# Patient Record
Sex: FEMALE | Race: BLACK OR AFRICAN AMERICAN | Employment: FULL TIME | URBAN - METROPOLITAN AREA
[De-identification: names, ages, dates, MRNs, and addresses within clinical notes are randomized per-mention and may not be internally consistent; named-entity substitution may affect disease eponyms.]

---

## 2019-11-23 ENCOUNTER — HOSPITAL ENCOUNTER (EMERGENCY)
Age: 54
Discharge: HOME | End: 2019-11-23
Attending: EMERGENCY MEDICINE
Payer: COMMERCIAL

## 2019-11-23 VITALS
RESPIRATION RATE: 14 BRPM | WEIGHT: 185 LBS | BODY MASS INDEX: 32.77 KG/M2 | OXYGEN SATURATION: 99 % | TEMPERATURE: 97.7 F | SYSTOLIC BLOOD PRESSURE: 137 MMHG | HEART RATE: 89 BPM | DIASTOLIC BLOOD PRESSURE: 86 MMHG

## 2019-11-23 DIAGNOSIS — K08.89 PAIN, DENTAL: Primary | ICD-10-CM

## 2019-11-23 PROCEDURE — 99282 EMERGENCY DEPT VISIT SF MDM: CPT

## 2019-11-23 PROCEDURE — 96372 THER/PROPH/DIAG INJ SC/IM: CPT

## 2019-11-23 PROCEDURE — 6360000002 HC RX W HCPCS: Performed by: STUDENT IN AN ORGANIZED HEALTH CARE EDUCATION/TRAINING PROGRAM

## 2019-11-23 PROCEDURE — 6370000000 HC RX 637 (ALT 250 FOR IP): Performed by: STUDENT IN AN ORGANIZED HEALTH CARE EDUCATION/TRAINING PROGRAM

## 2019-11-23 RX ORDER — KETOROLAC TROMETHAMINE 30 MG/ML
30 INJECTION, SOLUTION INTRAMUSCULAR; INTRAVENOUS ONCE
Status: COMPLETED | OUTPATIENT
Start: 2019-11-23 | End: 2019-11-23

## 2019-11-23 RX ORDER — IBUPROFEN 800 MG/1
800 TABLET ORAL EVERY 8 HOURS PRN
Qty: 30 TABLET | Refills: 0 | Status: SHIPPED | OUTPATIENT
Start: 2019-11-23

## 2019-11-23 RX ORDER — ACETAMINOPHEN 500 MG
1000 TABLET ORAL EVERY 6 HOURS PRN
Qty: 30 TABLET | Refills: 0 | Status: SHIPPED | OUTPATIENT
Start: 2019-11-23

## 2019-11-23 RX ORDER — ACETAMINOPHEN 500 MG
1000 TABLET ORAL ONCE
Status: COMPLETED | OUTPATIENT
Start: 2019-11-23 | End: 2019-11-23

## 2019-11-23 RX ADMIN — ACETAMINOPHEN 1000 MG: 500 TABLET ORAL at 15:08

## 2019-11-23 RX ADMIN — KETOROLAC TROMETHAMINE 30 MG: 30 INJECTION, SOLUTION INTRAMUSCULAR at 15:03

## 2019-11-23 ASSESSMENT — PAIN DESCRIPTION - ORIENTATION: ORIENTATION: RIGHT;LOWER

## 2019-11-23 ASSESSMENT — ENCOUNTER SYMPTOMS
WHEEZING: 0
VOMITING: 0
RHINORRHEA: 0
NAUSEA: 0
SHORTNESS OF BREATH: 0
PHOTOPHOBIA: 0
COLOR CHANGE: 0
ABDOMINAL PAIN: 0

## 2019-11-23 ASSESSMENT — PAIN DESCRIPTION - ONSET: ONSET: ON-GOING

## 2019-11-23 ASSESSMENT — PAIN DESCRIPTION - PROGRESSION: CLINICAL_PROGRESSION: GRADUALLY WORSENING

## 2019-11-23 ASSESSMENT — PAIN DESCRIPTION - LOCATION: LOCATION: JAW

## 2019-11-23 ASSESSMENT — PAIN SCALES - GENERAL
PAINLEVEL_OUTOF10: 10

## 2019-11-23 ASSESSMENT — PAIN DESCRIPTION - PAIN TYPE: TYPE: ACUTE PAIN

## 2019-11-23 ASSESSMENT — PAIN DESCRIPTION - FREQUENCY: FREQUENCY: CONTINUOUS

## 2021-06-06 ENCOUNTER — HOSPITAL ENCOUNTER (EMERGENCY)
Age: 56
Discharge: HOME | End: 2021-06-06
Attending: EMERGENCY MEDICINE
Payer: COMMERCIAL

## 2021-06-06 VITALS
BODY MASS INDEX: 34.02 KG/M2 | HEIGHT: 63 IN | OXYGEN SATURATION: 100 % | HEART RATE: 94 BPM | RESPIRATION RATE: 16 BRPM | WEIGHT: 192 LBS | TEMPERATURE: 97.3 F

## 2021-06-06 DIAGNOSIS — K04.7 DENTAL INFECTION: Primary | ICD-10-CM

## 2021-06-06 PROCEDURE — 6370000000 HC RX 637 (ALT 250 FOR IP): Performed by: STUDENT IN AN ORGANIZED HEALTH CARE EDUCATION/TRAINING PROGRAM

## 2021-06-06 PROCEDURE — 99283 EMERGENCY DEPT VISIT LOW MDM: CPT

## 2021-06-06 RX ORDER — CLINDAMYCIN HYDROCHLORIDE 150 MG/1
150 CAPSULE ORAL ONCE
Status: COMPLETED | OUTPATIENT
Start: 2021-06-06 | End: 2021-06-06

## 2021-06-06 RX ORDER — CLINDAMYCIN HYDROCHLORIDE 300 MG/1
300 CAPSULE ORAL 3 TIMES DAILY
Qty: 21 CAPSULE | Refills: 0 | Status: SHIPPED | OUTPATIENT
Start: 2021-06-06 | End: 2021-06-13

## 2021-06-06 RX ORDER — OXYCODONE HYDROCHLORIDE AND ACETAMINOPHEN 5; 325 MG/1; MG/1
1 TABLET ORAL ONCE
Status: COMPLETED | OUTPATIENT
Start: 2021-06-06 | End: 2021-06-06

## 2021-06-06 RX ORDER — IBUPROFEN 600 MG/1
600 TABLET ORAL 3 TIMES DAILY PRN
Qty: 15 TABLET | Refills: 0 | Status: SHIPPED | OUTPATIENT
Start: 2021-06-06 | End: 2021-06-11

## 2021-06-06 RX ADMIN — CLINDAMYCIN HYDROCHLORIDE 150 MG: 150 CAPSULE ORAL at 19:42

## 2021-06-06 RX ADMIN — OXYCODONE HYDROCHLORIDE AND ACETAMINOPHEN 1 TABLET: 5; 325 TABLET ORAL at 19:17

## 2021-06-06 RX ADMIN — CLINDAMYCIN HYDROCHLORIDE 150 MG: 150 CAPSULE ORAL at 19:17

## 2021-06-06 ASSESSMENT — PAIN DESCRIPTION - DESCRIPTORS: DESCRIPTORS: CONSTANT

## 2021-06-06 ASSESSMENT — ENCOUNTER SYMPTOMS
VOMITING: 0
ABDOMINAL PAIN: 0
COUGH: 0
SHORTNESS OF BREATH: 0
SORE THROAT: 0
FACIAL SWELLING: 0
TROUBLE SWALLOWING: 0
DIARRHEA: 0

## 2021-06-06 ASSESSMENT — PAIN DESCRIPTION - FREQUENCY: FREQUENCY: CONTINUOUS

## 2021-06-06 ASSESSMENT — PAIN DESCRIPTION - ORIENTATION: ORIENTATION: LEFT;UPPER

## 2021-06-06 ASSESSMENT — PAIN DESCRIPTION - PAIN TYPE: TYPE: CHRONIC PAIN

## 2021-06-06 ASSESSMENT — PAIN DESCRIPTION - LOCATION: LOCATION: MOUTH

## 2021-06-06 ASSESSMENT — PAIN SCALES - GENERAL: PAINLEVEL_OUTOF10: 9

## 2021-06-06 NOTE — ED NOTES
Bed: 05  Expected date:   Expected time:   Means of arrival:   Comments:  JORGE Vazquez, RN  06/06/21 4725

## 2021-06-06 NOTE — ED PROVIDER NOTES
[facility]  ED  Emergency Department Encounter  EmergencyMedicine Resident     Pt Name:Larisa Angel  MRN: [id number]  [place] 1965  Date of evaluation: 6/6/21  PCP:  Karey Sandifer, MD    CHIEF COMPLAINT       Chief Complaint   Patient presents with    Dental Pain       HISTORY OF PRESENT ILLNESS  (Location/Symptom, Timing/Onset, Context/Setting, Quality, Duration, Modifying Factors, Severity.)      Qamar Almaguer is a 63 y.o. female with no significant past medical history presenting with 1 month history of gum swelling and pain in the area of left upper canine. Reports that pain is getting worse, not associated with any discharge or fever. No trismus, facial swelling or inability to swallow saliva. Able to eat and drink fine without any trouble. Denies chest pain, shortness of breath, vomiting, abdominal pain, dysuria or leg swelling. Gums appear red and are tender in the left upper canine area, but no fluctuance otherwise. Patient is allergic to penicillins. PAST MEDICAL / SURGICAL / SOCIAL / FAMILY HISTORY      has a past medical history of Allergic rhinitis and Seizures ([facility] [address] has no past surgical history on file.     Social History     Socioeconomic History    Marital status: Single     Spouse name: Not on file    Number of children: Not on file    Years of education: Not on file    Highest education level: Not on file   Occupational History    Not on file   Tobacco Use    Smoking status: Current Every Day Smoker     Packs/day: 1.00     Years: 30.00     Pack years: 30.00     Types: Cigarettes    Smokeless tobacco: Never Used   Substance and Sexual Activity    Alcohol use: Yes     Comment: weekends-  beer and liquor    Drug use: No    Sexual activity: Not on file   Other Topics Concern    Not on file   Social History Narrative    Not on file     Social Determinants of Health     Financial Resource Strain:     Difficulty of Paying Living Expenses: Food Insecurity:     Worried About Running Out of Food in the Last Year:     [address] in the Last Year:    Transportation Needs:     Lack of Transportation (Medical):  Lack of Transportation (Non-Medical):    Physical Activity:     Days of Exercise per Week:     Minutes of Exercise per Session:    Stress:     Feeling of Stress :    Social Connections:     Frequency of Communication with Friends and Family:     Frequency of Social Gatherings with Friends and Family:     Attends [employer] Services:     Active Member of Clubs or Organizations:     Attends Club or Organization Meetings:     Marital Status:    Intimate Partner Violence:     Fear of Current or Ex-Partner:     Emotionally Abused:     Physically Abused:     Sexually Abused:        Family History   Problem Relation Age of Onset    Diabetes Mother        Allergies:  Pcn [penicillins]    Home Medications:  Prior to Admission medications    Medication Sig Start Date End Date Taking? Authorizing Provider   clindamycin (CLEOCIN) 300 MG capsule Take 1 capsule by mouth 3 times daily for 7 days 6/6/21 6/13/21 Yes Malou Jean MD   ibuprofen (ADVIL;MOTRIN) 600 MG tablet Take 1 tablet by mouth 3 times daily as needed for Pain 6/6/21 6/11/21 Yes Felipe Olguin MD   acetaminophen (TYLENOL) 500 MG tablet Take 2 tablets by mouth every 6 hours as needed for Pain Do no exceed this dose recommendation 11/23/19   Regine Salas DO   ibuprofen (ADVIL;MOTRIN) 800 MG tablet Take 1 tablet by mouth every 8 hours as needed for Pain Do not exceed dose recommendation 11/23/19   Regine Salas DO   nicotine polacrilex (NICORETTE) 2 MG gum Take 1 each by mouth as needed for Smoking cessation Maximum dose: 24 pieces/24 hours.  10/10/16   Suraj Clayton MD   naproxen (NAPROSYN) 250 MG tablet Take 2 tablets by mouth 2 times daily (with meals) 9/12/16   Shari Ruano DO   loratadine (CLARITIN) 10 MG tablet Take 1 tablet by mouth daily. 6/16/14   Vania Moscoso MD       REVIEW OF SYSTEMS    (2-9 systems for level 4, 10 or more for level 5)      Review of Systems   Constitutional: Negative for chills and fever. HENT: Positive for dental problem (dental pain). Negative for drooling, facial swelling, sore throat and trouble swallowing. Respiratory: Negative for cough and shortness of breath. Cardiovascular: Negative for chest pain and palpitations. Gastrointestinal: Negative for abdominal pain, diarrhea and vomiting. Genitourinary: Negative for dysuria and frequency. Musculoskeletal: Negative for joint swelling. Skin: Negative for wound. Neurological: Negative for dizziness and headaches. Hematological: Does not bruise/bleed easily. Psychiatric/Behavioral: Negative for agitation and confusion. PHYSICAL EXAM   (up to 7 for level 4, 8 or more for level 5)      INITIAL VITALS:   Pulse 94   Temp 97.3 °F (36.3 °C) (Temporal)   Resp 16   Ht 5' 3\" (1.6 m)   Wt 192 lb (87.1 kg)   SpO2 100%   BMI 34.01 kg/m²     Alert and oriented x3  Left upper canine are gum tenderness, redness but no fluctuance. No facial swelling. No oral discharge  Chest clear b/l  Abdomen soft, non tender and non-distended  No focal neurological deficit  No pedal edema    DIFFERENTIAL  DIAGNOSIS     PLAN (LABS / IMAGING / EKG):  No orders of the defined types were placed in this encounter. MEDICATIONS ORDERED:  Orders Placed This Encounter   Medications    oxyCODONE-acetaminophen (PERCOCET) 5-325 MG per tablet 1 tablet    clindamycin (CLEOCIN) capsule 150 mg     Order Specific Question:   Antimicrobial Indications     Answer: Other     Order Specific Question:   Other Abx Indication     Answer:   dental infection    clindamycin (CLEOCIN) capsule 150 mg     Order Specific Question:   Antimicrobial Indications     Answer:    Other     Order Specific Question:   Other Abx Indication     Answer:   dental infection    clindamycin (CLEOCIN) 300 MG capsule     Sig: Take 1 capsule by mouth 3 times daily for 7 days     Dispense:  21 capsule     Refill:  0    ibuprofen (ADVIL;MOTRIN) 600 MG tablet     Sig: Take 1 tablet by mouth 3 times daily as needed for Pain     Dispense:  15 tablet     Refill:  0           DIAGNOSTIC RESULTS / EMERGENCY DEPARTMENT COURSE / MDM     LABS:  No results found for this visit on 06/06/21. RADIOLOGY:  No results found. EKG  None    All EKG's are interpreted by the Emergency Department Physician who either signs or Co-signs this chart in the absence of a cardiologist.    EMERGENCY DEPARTMENT COURSE/IMPRESSION:      65-year-old female presenting with left upper canine area dental and gum pain. Patient has acute tenderness in the gum area, but no clear fluctuance. No trismus or facial swelling. No concern of Pb's angina or dental abscess. Given a dose of clindamycin and Percocet. Plan to discharge home on clindamycin and ibuprofen. Follow-up with the dentist.  Return back to ED in case of worsening symptoms or facial swelling. Patient verbalized understanding. PROCEDURES:  None    CONSULTS:  None    CRITICAL CARE:  None    FINAL IMPRESSION      1. Dental infection          DISPOSITION / PLAN     DISPOSITION    Discharge home    PATIENT REFERRED TO:  No follow-up provider specified.     DISCHARGE MEDICATIONS:  New Prescriptions    CLINDAMYCIN (CLEOCIN) 300 MG CAPSULE    Take 1 capsule by mouth 3 times daily for 7 days    IBUPROFEN (ADVIL;MOTRIN) 600 MG TABLET    Take 1 tablet by mouth 3 times daily as needed for Pain       Felipe Alvarez MD  Emergency Medicine Resident    (Please note that portions of this note were completed with a voice recognition program.  Efforts were made to edit the dictations but occasionally words aremis-transcribed.)       Ashley Sumner MD  Resident  06/06/21 2005

## 2021-06-07 NOTE — ED PROVIDER NOTES
[facility]     Emergency Department     Faculty Attestation    I performed a history and physical examination of the patient and discussed management with the resident. I reviewed the residents note and agree with the documented findings and plan of care. Any areas of disagreement are noted on the chart. I was personally present for the key portions of any procedures. I have documented in the chart those procedures where I was not present during the key portions. I have reviewed the emergency nurses triage note. I agree with the chief complaint, past medical history, past surgical history, allergies, medications, social and family history as documented unless otherwise noted below. For Physician Assistant/ Nurse Practitioner cases/documentation I have personally evaluated this patient and have completed at least one if not all key elements of the E/M (history, physical exam, and MDM). Additional findings are as noted. I have personally seen and evaluated the patient. I find the patient's history and physical exam are consistent with the NP/PA documentation. I agree with the care provided, treatment rendered, disposition and follow-up plan. 79-year-old female presenting with dental pain over the left upper premolars. No trauma, has been having swelling for approximately 1 month. Last saw her dentist several months ago. No bleeding or drainage from the mouth. Has been using Orajel at home with no significant improvement. No fevers or chills. Did notice some facial swelling this morning. Exam:  General: Sitting on the bed, awake, alert and in no acute distress  CV: normal rate and regular rhythm  Lungs: Breathing comfortably on room air with no tachypnea, hypoxia, or increased work of breathing  HEENT: Mild swelling of the zygoma on the left compared to the right. Gum erythema and slight edema over tooth 12 and 13. No significant fluctuance.   No fractured tooth visible. Plan:  Gum erythema and edema, possible developing abscess. Will start on clindamycin (allergic to penicillin), give pain control, and have her follow-up with her dentist.  Discussed return precautions verbally and in discharge paperwork; including worsening swelling, difficulty swallowing or breathing, difficulty opening the mouth, or any new symptoms.         Corwin Evans MD   Attending Emergency  Physician    (Please note that portions of this note were completed with a voice recognition program. Efforts were made to edit the dictations but occasionally words are mis-transcribed.)              Corwin Evans MD  06/07/21 4421

## 2021-08-29 ENCOUNTER — HOSPITAL ENCOUNTER (EMERGENCY)
Age: 56
Discharge: HOME | End: 2021-08-29
Attending: EMERGENCY MEDICINE
Payer: COMMERCIAL

## 2021-08-29 VITALS
RESPIRATION RATE: 16 BRPM | DIASTOLIC BLOOD PRESSURE: 88 MMHG | BODY MASS INDEX: 34.02 KG/M2 | TEMPERATURE: 97 F | HEART RATE: 107 BPM | OXYGEN SATURATION: 100 % | HEIGHT: 63 IN | WEIGHT: 192 LBS | SYSTOLIC BLOOD PRESSURE: 126 MMHG

## 2021-08-29 DIAGNOSIS — L30.4 INTERTRIGINOUS DERMATITIS ASSOCIATED WITH MOISTURE: Primary | ICD-10-CM

## 2021-08-29 LAB
CHP ED QC CHECK: YES
GLUCOSE BLD-MCNC: 93 MG/DL
GLUCOSE BLD-MCNC: 93 MG/DL (ref 65–105)

## 2021-08-29 PROCEDURE — 99281 EMR DPT VST MAYX REQ PHY/QHP: CPT

## 2021-08-29 PROCEDURE — 82947 ASSAY GLUCOSE BLOOD QUANT: CPT

## 2021-08-29 RX ORDER — NYSTATIN 100000 U/G
CREAM TOPICAL
Qty: 1 TUBE | Refills: 0 | Status: SHIPPED | OUTPATIENT
Start: 2021-08-29

## 2021-08-29 ASSESSMENT — ENCOUNTER SYMPTOMS
VOMITING: 0
ABDOMINAL PAIN: 0
COUGH: 0
SHORTNESS OF BREATH: 0
NAUSEA: 0

## 2021-08-29 NOTE — ED PROVIDER NOTES
[facility]  Emergency Department  Faculty Attestation     I performed a history and physical examination of the patient and discussed management with the resident. I reviewed the residents note and agree with the documented findings and plan of care. Any areas of disagreement are noted on the chart. I was personally present for the key portions of any procedures. I have documented in the chart those procedures where I was not present during the key portions. I have reviewed the emergency nurses triage note. I agree with the chief complaint, past medical history, past surgical history, allergies, medications, social and family history as documented unless otherwise noted below. For Physician Assistant/ Nurse Practitioner cases/documentation I have personally evaluated this patient and have completed at least one if not all key elements of the E/M (history, physical exam, and MDM). Additional findings are as noted. Primary Care Physician:  Valentino Story MD    Screenings:  [address]       Chief Complaint   Patient presents with    Rash     x 6 months, below breasts, states she has used topical ointment with no relief       RECENT VITALS:   Temp: 97 °F (36.1 °C),  Pulse: 107, Resp: 16, BP: 126/88    LABS:  Labs Reviewed   POCT GLUCOSE       Radiology  No orders to display         Attending Physician Additional  Notes    Patient has itchy red scaly rash beneath both breasts. She is concerned also about diabetes since she has increase in thirst, urination and tingling in her feet and there is a strong family history. No prior diabetic history. No recent antibiotics. On exam she is nontoxic vital signs reveal initial tachycardia otherwise normal.  Skin is warm and dry.   Breast exam per resident shows concern for yeast.  Plan is topical antifungals, will do Accu-Chek for blood sugar, recommend follow-up with PCP for hemoglobin A1c.            Abel De Anda.  Jackie Cano MD, [address] [facility], [place], [facility],[address]  Attending Emergency  Physician               Hemal Cannon MD  08/29/21 1106

## 2021-08-29 NOTE — ED PROVIDER NOTES
[facility] ED  Emergency Department Encounter  Emergency Medicine Resident     Pt Name: Tena Watson  MRN: [id number]  [place] 1965  Date of evaluation: 8/29/21  PCP:  Edd Priest MD    [address] [place] [address]       Chief Complaint   Patient presents with    Rash     x 6 months, below breasts, states she has used topical ointment with no relief       HISTORY OFPRESENT ILLNESS  (Location/Symptom, Timing/Onset, Context/Setting, Quality, Duration, Modifying Factors,Severity.)      Tena Watson is a 61 y. o.yo female who presents with rash, irritation over her bilateral underneath her breast.  Patient states that rash/induration has been ongoing for months. She has used Neosporin and other home remedies in which she cannot remember without any alleviation of symptoms. Patient states that rash has progressively worsened. She denies any fever, chills, weight loss, bleeding over the rash area. Denies any chest pain, shortness of breath, headache, numbness or tingling upper or lower extremities. Patient states that she does not have any other complaints today. She would like to have something to help with the rash. PAST MEDICAL / SURGICAL / SOCIAL / FAMILY HISTORY      has a past medical history of Allergic rhinitis and Seizures ([facility] [address] has no past surgical history on file.      Social History     Socioeconomic History    Marital status: Single     Spouse name: Not on file    Number of children: Not on file    Years of education: Not on file    Highest education level: Not on file   Occupational History    Not on file   Tobacco Use    Smoking status: Current Every Day Smoker     Packs/day: 1.00     Years: 30.00     Pack years: 30.00     Types: Cigarettes    Smokeless tobacco: Never Used   Substance and Sexual Activity    Alcohol use: Yes     Comment: weekends-  beer and liquor    Drug use: No    Sexual activity: Not on file   Other Topics Concern    Not on file Social History Narrative    Not on file     Social Determinants of Health     Financial Resource Strain:     Difficulty of Paying Living Expenses:    Food Insecurity:     Worried About Running Out of Food in the Last Year:     [address] in the Last Year:    Transportation Needs:     Lack of Transportation (Medical):  Lack of Transportation (Non-Medical):    Physical Activity:     Days of Exercise per Week:     Minutes of Exercise per Session:    Stress:     Feeling of Stress :    Social Connections:     Frequency of Communication with Friends and Family:     Frequency of Social Gatherings with Friends and Family:     Attends Zoroastrianism Services:     Active Member of Clubs or Organizations:     Attends Club or Organization Meetings:     Marital Status:    Intimate Partner Violence:     Fear of Current or Ex-Partner:     Emotionally Abused:     Physically Abused:     Sexually Abused:        Family History   Problem Relation Age of Onset    Diabetes Mother         Allergies:  Pcn [penicillins]    Home Medications:  Prior to Admission medications    Medication Sig Start Date End Date Taking? Authorizing Provider   ibuprofen (ADVIL;MOTRIN) 600 MG tablet Take 1 tablet by mouth 3 times daily as needed for Pain 6/6/21 6/11/21  Felipe Grace MD   acetaminophen (TYLENOL) 500 MG tablet Take 2 tablets by mouth every 6 hours as needed for Pain Do no exceed this dose recommendation 11/23/19   Juanita Caputo, DO   ibuprofen (ADVIL;MOTRIN) 800 MG tablet Take 1 tablet by mouth every 8 hours as needed for Pain Do not exceed dose recommendation 11/23/19   Iesha Caputo DO   nicotine polacrilex (NICORETTE) 2 MG gum Take 1 each by mouth as needed for Smoking cessation Maximum dose: 24 pieces/24 hours.  10/10/16   Suraj Siddiqui MD   naproxen (NAPROSYN) 250 MG tablet Take 2 tablets by mouth 2 times daily (with meals) 9/12/16   Seth Mcdonnell,    loratadine (CLARITIN) 10 MG tablet Take 1 tablet by mouth daily. 6/16/14   Juel Barthel, MD       REVIEW OFSYSTEMS    (2-9 systems for level 4, 10 or more for level 5)      Review of Systems   Constitutional: Negative for appetite change, fatigue and fever. Respiratory: Negative for cough and shortness of breath. Cardiovascular: Negative for chest pain and leg swelling. Gastrointestinal: Negative for abdominal pain, nausea and vomiting. Genitourinary: Negative for dysuria and urgency. Skin: Positive for rash. Neurological: Negative for light-headedness and headaches. Psychiatric/Behavioral: Negative for behavioral problems and confusion. PHYSICAL EXAM   (up to 7 for level 4, 8 or more forlevel 5)      INITIAL VITALS:   ED Triage Vitals [08/29/21 0940]   BP Temp Temp Source Pulse Resp SpO2 Height Weight   126/88 97 °F (36.1 °C) Oral 107 16 100 % 5' 3\" (1.6 m) 192 lb (87.1 kg)       Physical Exam  Constitutional:       Appearance: Normal appearance. HENT:      Head: Normocephalic and atraumatic. Nose: Nose normal.      Mouth/Throat:      Mouth: Mucous membranes are moist.   Eyes:      Extraocular Movements: Extraocular movements intact. Pupils: Pupils are equal, round, and reactive to light. Cardiovascular:      Rate and Rhythm: Normal rate and regular rhythm. Pulmonary:      Effort: Pulmonary effort is normal. No respiratory distress. Abdominal:      General: There is no distension. Palpations: Abdomen is soft. Tenderness: There is no abdominal tenderness. Musculoskeletal:         General: No swelling or tenderness. Cervical back: Normal range of motion. No rigidity or tenderness. Skin:     General: Skin is warm. Coloration: Skin is not jaundiced. Findings: Rash present. Neurological:      General: No focal deficit present. Mental Status: She is alert and oriented to person, place, and time.    Psychiatric:         Mood and Affect: Mood normal.         Behavior: Behavior

## 2022-03-12 ENCOUNTER — HOSPITAL ENCOUNTER (EMERGENCY)
Age: 57
Discharge: HOME | End: 2022-03-12
Attending: EMERGENCY MEDICINE
Payer: COMMERCIAL

## 2022-03-12 VITALS
DIASTOLIC BLOOD PRESSURE: 81 MMHG | TEMPERATURE: 98.1 F | SYSTOLIC BLOOD PRESSURE: 130 MMHG | HEART RATE: 103 BPM | RESPIRATION RATE: 17 BRPM | OXYGEN SATURATION: 99 %

## 2022-03-12 DIAGNOSIS — K02.9 DENTAL CARIES: ICD-10-CM

## 2022-03-12 DIAGNOSIS — K08.89 PAIN, DENTAL: Primary | ICD-10-CM

## 2022-03-12 PROCEDURE — 6370000000 HC RX 637 (ALT 250 FOR IP): Performed by: STUDENT IN AN ORGANIZED HEALTH CARE EDUCATION/TRAINING PROGRAM

## 2022-03-12 PROCEDURE — 99283 EMERGENCY DEPT VISIT LOW MDM: CPT

## 2022-03-12 RX ORDER — CLINDAMYCIN HYDROCHLORIDE 150 MG/1
450 CAPSULE ORAL 3 TIMES DAILY
Qty: 63 CAPSULE | Refills: 0 | Status: SHIPPED | OUTPATIENT
Start: 2022-03-12 | End: 2022-03-19

## 2022-03-12 RX ORDER — ACETAMINOPHEN 500 MG
1000 TABLET ORAL ONCE
Status: COMPLETED | OUTPATIENT
Start: 2022-03-12 | End: 2022-03-12

## 2022-03-12 RX ORDER — IBUPROFEN 800 MG/1
800 TABLET ORAL ONCE
Status: DISCONTINUED | OUTPATIENT
Start: 2022-03-12 | End: 2022-03-12 | Stop reason: HOSPADM

## 2022-03-12 RX ORDER — CLINDAMYCIN HYDROCHLORIDE 150 MG/1
450 CAPSULE ORAL ONCE
Status: COMPLETED | OUTPATIENT
Start: 2022-03-12 | End: 2022-03-12

## 2022-03-12 RX ADMIN — BENZOCAINE 1 EACH: 220 GEL, DENTIFRICE DENTAL at 07:59

## 2022-03-12 RX ADMIN — ACETAMINOPHEN 1000 MG: 500 TABLET ORAL at 07:56

## 2022-03-12 RX ADMIN — CLINDAMYCIN HYDROCHLORIDE 450 MG: 150 CAPSULE ORAL at 07:56

## 2022-03-12 ASSESSMENT — PAIN SCALES - GENERAL
PAINLEVEL_OUTOF10: 10

## 2022-03-12 ASSESSMENT — PAIN - FUNCTIONAL ASSESSMENT: PAIN_FUNCTIONAL_ASSESSMENT: 0-10

## 2022-03-12 ASSESSMENT — ENCOUNTER SYMPTOMS
COUGH: 0
VOMITING: 0
DIARRHEA: 0
ABDOMINAL PAIN: 0
CONSTIPATION: 0
SORE THROAT: 0
NAUSEA: 0
SHORTNESS OF BREATH: 0
FACIAL SWELLING: 0
PHOTOPHOBIA: 0
TROUBLE SWALLOWING: 0
VOICE CHANGE: 0

## 2022-03-12 ASSESSMENT — PAIN DESCRIPTION - LOCATION: LOCATION: TEETH

## 2022-03-12 ASSESSMENT — PAIN DESCRIPTION - DESCRIPTORS: DESCRIPTORS: SHARP;SHOOTING

## 2022-03-12 ASSESSMENT — PAIN DESCRIPTION - ORIENTATION: ORIENTATION: RIGHT;UPPER

## 2022-03-12 ASSESSMENT — PAIN DESCRIPTION - FREQUENCY: FREQUENCY: CONTINUOUS

## 2022-03-12 NOTE — ED NOTES
Patient in need of an emergent dental appointment. Patient scheduled at the [address] [place] [address] for Monday, 3/14/2022 at 9:30am, which was given to patient in writing. Patient says she can walk to the appointment. Referral faxed to the [address] [place] [address]. Laina Figueredo.  [address], [address] [place] [address]  03/12/22 0659

## 2022-03-12 NOTE — ED PROVIDER NOTES
[facility]     Emergency Department     Faculty Attestation    I performed a history and physical examination of the patient and discussed management with the resident. I reviewed the residents note and agree with the documented findings and plan of care. Any areas of disagreement are noted on the chart. I was personally present for the key portions of any procedures. I have documented in the chart those procedures where I was not present during the key portions. I have reviewed the emergency nurses triage note. I agree with the chief complaint, past medical history, past surgical history, allergies, medications, social and family history as documented unless otherwise noted below. Documentation of the HPI, Physical Exam and Medical Decision Making performed by medical students or scribes is based on my personal performance of the HPI, PE and MDM. For Physician Assistant/ Nurse Practitioner cases/documentation I have personally evaluated this patient and have completed at least one if not all key elements of the E/M (history, physical exam, and MDM). Additional findings are as noted. Primary Care Physician: César Drake MD    History: This is a 61 y.o. female who presents to the Emergency Department with complaint of Dental pain. Is been ongoing for last 2 days. Patient denies any fever, chills or sweats. The patient denies any difficulty swallowing or shortness of breath    Physical:   oral temperature is 98.1 °F (36.7 °C). Her blood pressure is 130/81 and her pulse is 103. Her respiration is 17 and oxygen saturation is 99%. The patient has multiple dental caries noted. There is no tongue elevation noted. The patient has no abscess. Airways patent there is no pooling of oral secretions.      Impression: Dental caries    Plan: Antibiotics, analgesia and dentist follow-up    Sophy James MD, Kristine Hollis  Attending Emergency  Physician Chiquis Reddy MD  03/12/22 1522

## 2022-03-12 NOTE — ED PROVIDER NOTES
[facility] ED  Emergency Department Encounter  EmergencyMedicine Resident     Pt Name:Larisa Astorga  MRN: [id number]  [place] 1965  Date of evaluation: 3/12/22  PCP:  Andrew Hernandez MD    [address] [place] [address]       Chief Complaint   Patient presents with    Dental Pain     Rt upper       HISTORY OF PRESENT ILLNESS  (Location/Symptom, Timing/Onset, Context/Setting, Quality, Duration, Modifying Factors, Severity.)      Kaylynn Toledo is a 61 y.o. female who presents with dental pain. She notes that for the past few months has been having intermittent stabbing throbbing right maxillary dental pain. She notes the past few days the pain has returned and is gotten significantly worse. She is been taking Tylenol and Motrin which has not helped her pain. She notes that the pain is worsened with eating and drinking, especially \"cold drinks\". She has a dental clinic appointment for 3/31/2022 but notes that she cannot make it there. She is otherwise been doing well with no fevers, chills, chest pain, shortness breath, drooling, difficulty breathing, abdominal pain, nausea/vomiting. PAST MEDICAL / SURGICAL / SOCIAL / FAMILY HISTORY      has a past medical history of Allergic rhinitis and Seizures ([facility] [address] Pt denies any pertinent past surgical history.     Social History     Socioeconomic History    Marital status: Single     Spouse name: Not on file    Number of children: Not on file    Years of education: Not on file    Highest education level: Not on file   Occupational History    Not on file   Tobacco Use    Smoking status: Current Every Day Smoker     Packs/day: 1.00     Years: 30.00     Pack years: 30.00     Types: Cigarettes    Smokeless tobacco: Never Used   Substance and Sexual Activity    Alcohol use: Yes     Comment: weekends-  beer and liquor    Drug use: No    Sexual activity: Not on file   Other Topics Concern    Not on file   Social History Narrative    Not on file     Social Determinants of Health     Financial Resource Strain:     Difficulty of Paying Living Expenses: Not on file   Food Insecurity:     Worried About Running Out of Food in the Last Year: Not on file    [place] of Food in the Last Year: Not on file   Transportation Needs:     Lack of Transportation (Medical): Not on file    Lack of Transportation (Non-Medical): Not on file   Physical Activity:     Days of Exercise per Week: Not on file    Minutes of Exercise per Session: Not on file   Stress:     Feeling of Stress : Not on file   Social Connections:     Frequency of Communication with Friends and Family: Not on file    Frequency of Social Gatherings with Friends and Family: Not on file    Attends [employer] Services: Not on file    Active Member of [address] [place] [address] [employer] or Organizations: Not on file    Attends Club or Organization Meetings: Not on file    Marital Status: Not on file   Intimate Partner Violence:     Fear of Current or Ex-Partner: Not on file    Emotionally Abused: Not on file    Physically Abused: Not on file    Sexually Abused: Not on file   Housing Stability:     Unable to Pay for Housing in the Last Year: Not on file    Number of [place] in the Last Year: Not on file    Unstable Housing in the Last Year: Not on file       Family History   Problem Relation Age of Onset    Diabetes Mother        Allergies:  Pcn [penicillins]    Home Medications:  Prior to Admission medications    Medication Sig Start Date End Date Taking? Authorizing Provider   clindamycin (CLEOCIN) 150 MG capsule Take 3 capsules by mouth 3 times daily for 7 days 3/12/22 3/19/22 Yes Darby Law DO   nystatin (MYCOSTATIN) 681960 UNIT/GM cream Apply topically 2 times daily.  8/29/21   Iban Vargas MD   ibuprofen (ADVIL;MOTRIN) 600 MG tablet Take 1 tablet by mouth 3 times daily as needed for Pain 6/6/21 6/11/21  Felipe Santo MD   acetaminophen (TYLENOL) 500 MG tablet Take 2 tablets by mouth every 6 hours as needed for Pain Do no exceed this dose recommendation 11/23/19   Juanita Patito, DO   ibuprofen (ADVIL;MOTRIN) 800 MG tablet Take 1 tablet by mouth every 8 hours as needed for Pain Do not exceed dose recommendation 11/23/19   Amy Patterson Patito, DO   nicotine polacrilex (NICORETTE) 2 MG gum Take 1 each by mouth as needed for Smoking cessation Maximum dose: 24 pieces/24 hours. 10/10/16   Suraj Ibarra MD   naproxen (NAPROSYN) 250 MG tablet Take 2 tablets by mouth 2 times daily (with meals) 9/12/16   James Schuster,    loratadine (CLARITIN) 10 MG tablet Take 1 tablet by mouth daily. 6/16/14   Nikhil Flores MD       REVIEW OF SYSTEMS    (2-9 systems for level 4, 10 or more for level 5)      Review of Systems   Constitutional: Negative for diaphoresis, fatigue and fever. HENT: Positive for dental problem. Negative for congestion, facial swelling, sore throat, trouble swallowing and voice change. Eyes: Negative for photophobia and visual disturbance. Respiratory: Negative for cough and shortness of breath. Cardiovascular: Negative for chest pain and palpitations. Gastrointestinal: Negative for abdominal pain, constipation, diarrhea, nausea and vomiting. Neurological: Negative for headaches. PHYSICAL EXAM   (up to 7 for level 4, 8 or more for level 5)      INITIAL VITALS:   /81   Pulse 103   Temp 98.1 °F (36.7 °C) (Oral)   Resp 17   LMP 09/12/2016   SpO2 99%     Physical Exam  Vitals and nursing note reviewed. Constitutional:       General: She is not in acute distress. Appearance: Normal appearance. She is normal weight. She is not toxic-appearing or diaphoretic. HENT:      Head: Normocephalic and atraumatic. Right Ear: External ear normal.      Left Ear: External ear normal.      Mouth/Throat:      Lips: Pink. Mouth: Mucous membranes are moist.      Dentition: Abnormal dentition. Does not have dentures. Dental tenderness present.  No gingival swelling, dental caries, dental abscesses or gum lesions. Tongue: No lesions. Tongue does not deviate from midline. Palate: No mass and lesions. Pharynx: Oropharynx is clear. Uvula midline. No pharyngeal swelling, oropharyngeal exudate, posterior oropharyngeal erythema or uvula swelling. Tonsils: No tonsillar exudate or tonsillar abscesses. 0 on the right. 0 on the left. Comments: 7 also soft nontender with no woody induration  Eyes:      General: No scleral icterus. Extraocular Movements: Extraocular movements intact. Conjunctiva/sclera: Conjunctivae normal.      Pupils: Pupils are equal, round, and reactive to light. Cardiovascular:      Rate and Rhythm: Normal rate and regular rhythm. Pulses: Normal pulses. Pulmonary:      Effort: Pulmonary effort is normal. No respiratory distress. Breath sounds: No stridor. Abdominal:      General: Abdomen is flat. There is no distension. Musculoskeletal:         General: Normal range of motion. Cervical back: Normal range of motion and neck supple. No rigidity. Skin:     General: Skin is warm and dry. Capillary Refill: Capillary refill takes less than 2 seconds. Neurological:      General: No focal deficit present. Mental Status: She is alert and oriented to person, place, and time. DIFFERENTIAL  DIAGNOSIS     PLAN (LABS / IMAGING / EKG):  No orders of the defined types were placed in this encounter.       MEDICATIONS ORDERED:  Orders Placed This Encounter   Medications    DISCONTD: ibuprofen (ADVIL;MOTRIN) tablet 800 mg    acetaminophen (TYLENOL) tablet 1,000 mg    benzocaine (LOLLICAINE) 20 % dental swab    clindamycin (CLEOCIN) capsule 450 mg     Order Specific Question:   Antimicrobial Indications     Answer:   Head and Neck Infection    clindamycin (CLEOCIN) 150 MG capsule     Sig: Take 3 capsules by mouth 3 times daily for 7 days     Dispense:  63 capsule     Refill:  0       DDX: Dental infection, dental pain, caries    DIAGNOSTIC RESULTS / EMERGENCY DEPARTMENT COURSE / MDM   LAB RESULTS:  No results found for this visit on 03/12/22. IMPRESSION: 67-year-old female presenting to the ER for dental pain. She is tender along the second tooth. Patient appears to be in mild distress but not toxic appearing. Patient's initial vitals are stable nonconcerning. Patient is speaking full sense of respiratory stress. No signs of Rebecca's angina on examination. No signs of apical abscess that requires drainage however the second tooth. Concern for dental infection that will need follow-up with dental clinic. Patient to give Tylenol, Motrin, lidocaine, and clindamycin. Patient will be discharged with the dental clinic appointment sooner than 3/31/2022. RADIOLOGY:  None    EKG  None    All EKG's are interpreted by the Emergency Department Physician who either signs or Co-signs this chart in the absence of a cardiologist.    EMERGENCY DEPARTMENT COURSE:  Patient feeling better after receiving medication. Patient agreeable discharge plan. She was educated return precautions. Patient ambulated out the ER without incident. PROCEDURES:  None    CONSULTS:  None    CRITICAL CARE:  Please see attending note    FINAL IMPRESSION      1. Pain, dental    2.  Dental caries          DISPOSITION / PLAN     DISPOSITION Decision To Discharge 03/12/2022 08:13:15 AM      PATIENT REFERRED TO:  [facility] ED  [address] [facility]  [phone]  Go to   If symptoms worsen    Chuck Mayfield MD  [address] [place] [address]  [phone]    Go to   As needed    [address]  [address], [place], [address] [place] [address]  [phone]  Go to   As scheduled for reevaluation      DISCHARGE MEDICATIONS:  Discharge Medication List as of 3/12/2022  8:13 AM      START taking these medications    Details   clindamycin (CLEOCIN) 150 MG capsule Take 3 capsules by mouth 3 times daily for 7 days, Disp-63 capsule, R-0Normal             Thang Caldera DO  Emergency Medicine Resident    (Please note that portions of thisnote were completed with a voice recognition program.  Efforts were made to edit the dictations but occasionally words are mis-transcribed.)        Thang Caldera DO  Resident  03/15/22 0889

## 2022-03-12 NOTE — ED TRIAGE NOTES
Pt arrived to ED 06 via triage. Pt co Rt upper dental pain x 1 month with it progressively getting worse. Pt states that she has been to the ER for this pain already and the soonest that her dentist can get her in is 3/31. Denies any drainage  Pt is resting on stretcher with call light within reach.   Breathing is non labored and no acute distress is noted

## 2022-07-12 ENCOUNTER — HOSPITAL ENCOUNTER (EMERGENCY)
Age: 57
Discharge: HOME | End: 2022-07-12
Attending: EMERGENCY MEDICINE
Payer: COMMERCIAL

## 2022-07-12 VITALS
WEIGHT: 163 LBS | TEMPERATURE: 97.3 F | SYSTOLIC BLOOD PRESSURE: 112 MMHG | BODY MASS INDEX: 28.88 KG/M2 | HEART RATE: 90 BPM | HEIGHT: 63 IN | RESPIRATION RATE: 18 BRPM | DIASTOLIC BLOOD PRESSURE: 76 MMHG | OXYGEN SATURATION: 100 %

## 2022-07-12 DIAGNOSIS — K04.7 DENTAL INFECTION: Primary | ICD-10-CM

## 2022-07-12 PROCEDURE — 6370000000 HC RX 637 (ALT 250 FOR IP): Performed by: STUDENT IN AN ORGANIZED HEALTH CARE EDUCATION/TRAINING PROGRAM

## 2022-07-12 PROCEDURE — 99283 EMERGENCY DEPT VISIT LOW MDM: CPT

## 2022-07-12 RX ORDER — CLINDAMYCIN HYDROCHLORIDE 150 MG/1
450 CAPSULE ORAL 3 TIMES DAILY
Qty: 63 CAPSULE | Refills: 0 | Status: SHIPPED | OUTPATIENT
Start: 2022-07-12 | End: 2022-07-19

## 2022-07-12 RX ORDER — CLINDAMYCIN HYDROCHLORIDE 150 MG/1
450 CAPSULE ORAL ONCE
Status: COMPLETED | OUTPATIENT
Start: 2022-07-12 | End: 2022-07-12

## 2022-07-12 RX ORDER — IBUPROFEN 800 MG/1
800 TABLET ORAL ONCE
Status: COMPLETED | OUTPATIENT
Start: 2022-07-12 | End: 2022-07-12

## 2022-07-12 RX ADMIN — IBUPROFEN 800 MG: 800 TABLET, FILM COATED ORAL at 12:12

## 2022-07-12 RX ADMIN — CLINDAMYCIN HYDROCHLORIDE 450 MG: 150 CAPSULE ORAL at 12:12

## 2022-07-12 ASSESSMENT — ENCOUNTER SYMPTOMS
ABDOMINAL PAIN: 0
VOMITING: 0
BACK PAIN: 0
SHORTNESS OF BREATH: 0
COUGH: 0
SORE THROAT: 0

## 2022-07-12 ASSESSMENT — PAIN DESCRIPTION - LOCATION
LOCATION: MOUTH
LOCATION: TEETH

## 2022-07-12 ASSESSMENT — PAIN - FUNCTIONAL ASSESSMENT: PAIN_FUNCTIONAL_ASSESSMENT: 0-10

## 2022-07-12 ASSESSMENT — PAIN DESCRIPTION - ORIENTATION: ORIENTATION: RIGHT;UPPER

## 2022-07-12 ASSESSMENT — PAIN SCALES - GENERAL
PAINLEVEL_OUTOF10: 9
PAINLEVEL_OUTOF10: 9

## 2022-07-12 NOTE — ED NOTES
Pt states dental pain x 1 day  Pt states she needs to have teeth pulled  Pt states dentist cant get her in for months     Vidhi Tovar, RAQUEL  [birth year] 0587

## 2022-07-12 NOTE — ED PROVIDER NOTES
[facility]     Emergency Department     Faculty Note/ Attestation      Pt Name: Katrina Zhu                                       MRN: [id number]  [place] 1965  Date of evaluation: 7/12/2022  Patients PCP:    Stephanie Blood MD    Attestation  I performed a history and physical examination of the patient/ or directly observed  and discussed management with the resident. I reviewed the residents note and agree with the documented findings and plan of care. Any areas of disagreement are noted on the chart. I was personally present for the key portions of any procedures. I have documented in the chart those procedures where I was not present during the key portions. I have reviewed the emergency nurses triage note. I agree with the chief complaint, past medical history, past surgical history, allergies, medications, social and family history as documented unless otherwise noted below. For Physician Assistant/ Nurse Practitioner cases/documentation I have personally evaluated this patient and have completed at least one if not all key elements of the E/M (history, physical exam, and MDM). Additional findings are as noted. This patient was evaluated in the Emergency Department for symptoms described in the history of present illness. The patient was evaluated in the context of the global COVID-19 pandemic, which necessitated consideration that the patient might be at risk for infection with the SARS-CoV-2 virus that causes COVID-19. Institutional protocols and algorithms that pertain to the evaluation of patients at risk for COVID-19 are in a state of rapid change based on information released by regulatory bodies including the CDC and federal and state organizations. These policies and algorithms were followed during the patient's care in the ED.      Initial Screens:             Vitals:    Vitals:    07/12/22 1151   BP: 112/76   Pulse: 90   Resp: 18   Temp: 97.3 °F (36.3 °C)   SpO2: 100%   Weight: 163 lb (73.9 kg)   Height: 5' 3\" (1.6 m)       Chief Complaint      Chief Complaint   Patient presents with    Dental Pain     right upper, wants pain meds           height is 5' 3\" (1.6 m) and weight is 163 lb (73.9 kg). Her temperature is 97.3 °F (36.3 °C). Her blood pressure is 112/76 and her pulse is 90. Her respiration is 18 and oxygen saturation is 100%. DIAGNOSTIC RESULTS       RADIOLOGY:   No orders to display         LABS:  Labs Reviewed - No data to display      EMERGENCY DEPARTMENT COURSE:     -------------------------      BP: 112/76, Temp: 97.3 °F (36.3 °C), Heart Rate: 90, Resp: 18    System Problem List     Patient Active Problem List   Diagnosis    Smoking       Comments  Chronic Prob List noted    No notes of EC Admission Criteria type on file. Patient has no issues with airway or swallowing. There is no obvious abscess formation that would require incision and drainage there is no evidence of chronic underlying osteomyelitis there is significant dental caries      Idalmis Saleh MD,, MD, F.A.C.E.P.   Attending Emergency Physician         Idalmis Saleh MD  07/12/22 5904

## 2022-07-12 NOTE — ED PROVIDER NOTES
[facility]  ED  Emergency Department Encounter  EmergencyMedicine Resident     Pt Noe Queen  MRN: [id number]  [place] 1965  Date of evaluation: 7/12/22  PCP:  Wilmar Hernandez MD      [address] [place] [address]       Chief Complaint   Patient presents with    Dental Pain     right upper, wants pain meds      HISTORY OF PRESENT ILLNESS  (Location/Symptom, Timing/Onset, Context/Setting, Quality, Duration, Modifying Factors, Severity.)      Adela Espinoza is a 61 y.o. female who presents with 1 day history of right upper dental pain. Patient states that she missed her dentist appointment 3 months ago as she got COVID, and at the time came to the emergency department for antibiotics which helped a lot. Patient at the time had clindamycin 453 times a day, patient states that she wants the exact same pills again because they were small and easy to swallow. Patient looking for the same today. Denies fevers, chills, sore throat, chest pain, shortness of breath. PAST MEDICAL / SURGICAL / SOCIAL / FAMILY HISTORY      has a past medical history of Allergic rhinitis and Seizures ([facility] [address] has no past surgical history on file.     Social History     Socioeconomic History    Marital status: Single     Spouse name: Not on file    Number of children: Not on file    Years of education: Not on file    Highest education level: Not on file   Occupational History    Not on file   Tobacco Use    Smoking status: Current Every Day Smoker     Packs/day: 1.00     Years: 30.00     Pack years: 30.00     Types: Cigarettes    Smokeless tobacco: Never Used   Substance and Sexual Activity    Alcohol use: Yes     Comment: weekends-  beer and liquor    Drug use: No    Sexual activity: Not on file   Other Topics Concern    Not on file   Social History Narrative    Not on file     Social Determinants of Health     Financial Resource Strain:     Difficulty of Paying Living Expenses: Not on file Food Insecurity:     Worried About Running Out of Food in the Last Year: Not on file    [place] of Food in the Last Year: Not on file   Transportation Needs:     Lack of Transportation (Medical): Not on file    Lack of Transportation (Non-Medical): Not on file   Physical Activity:     Days of Exercise per Week: Not on file    Minutes of Exercise per Session: Not on file   Stress:     Feeling of Stress : Not on file   Social Connections:     Frequency of Communication with Friends and Family: Not on file    Frequency of Social Gatherings with Friends and Family: Not on file    Attends Spiritism Services: Not on file    Active Member of [address] [place] [address] [employer] or Organizations: Not on file    Attends Club or Organization Meetings: Not on file    Marital Status: Not on file   Intimate Partner Violence:     Fear of Current or Ex-Partner: Not on file    Emotionally Abused: Not on file    Physically Abused: Not on file    Sexually Abused: Not on file   Housing Stability:     Unable to Pay for Housing in the Last Year: Not on file    Number of [place] in the Last Year: Not on file    Unstable Housing in the Last Year: Not on file       Family History   Problem Relation Age of Onset    Diabetes Mother        Allergies:  Pcn [penicillins]    Home Medications:  Prior to Admission medications    Medication Sig Start Date End Date Taking? Authorizing Provider   clindamycin (CLEOCIN) 150 MG capsule Take 3 capsules by mouth 3 times daily for 7 days 7/12/22 7/19/22 Yes Felix Diaz MD   nystatin (MYCOSTATIN) 800493 UNIT/GM cream Apply topically 2 times daily.  8/29/21   Iban Talbot MD   ibuprofen (ADVIL;MOTRIN) 600 MG tablet Take 1 tablet by mouth 3 times daily as needed for Pain 6/6/21 6/11/21  Felipe Hernandez MD   acetaminophen (TYLENOL) 500 MG tablet Take 2 tablets by mouth every 6 hours as needed for Pain Do no exceed this dose recommendation 11/23/19   Juanita Caputo,    ibuprofen (ADVIL;MOTRIN) 800 MG tablet Take 1 tablet by mouth every 8 hours as needed for Pain Do not exceed dose recommendation 11/23/19   Clorinda Go,    nicotine polacrilex (NICORETTE) 2 MG gum Take 1 each by mouth as needed for Smoking cessation Maximum dose: 24 pieces/24 hours. 10/10/16   Suraj Vinson MD   naproxen (NAPROSYN) 250 MG tablet Take 2 tablets by mouth 2 times daily (with meals) 9/12/16   Hamzah Landon DO   loratadine (CLARITIN) 10 MG tablet Take 1 tablet by mouth daily. 6/16/14   Jigna Pineda MD       REVIEW OF SYSTEMS    (2-9 systems for level 4, 10 or more for level 5)      Review of Systems   Constitutional: Negative for chills and fever. HENT: Negative for sore throat. Eyes: Negative for visual disturbance. Respiratory: Negative for cough and shortness of breath. Cardiovascular: Negative for chest pain. Gastrointestinal: Negative for abdominal pain and vomiting. Endocrine: Negative for polyuria. Genitourinary: Negative for dysuria and hematuria. Musculoskeletal: Negative for back pain. Neurological: Negative for light-headedness and headaches. Psychiatric/Behavioral: Negative for confusion. PHYSICAL EXAM   (up to 7 for level 4, 8 or more for level 5)      INITIAL VITALS:   /76   Pulse 90   Temp 97.3 °F (36.3 °C)   Resp 18   Ht 5' 3\" (1.6 m)   Wt 163 lb (73.9 kg)   LMP 09/12/2016   SpO2 100%   BMI 28.87 kg/m²     Physical Exam  Constitutional:       Appearance: Normal appearance. HENT:      Head: Normocephalic. Nose: Nose normal.      Mouth/Throat:      Mouth: Mucous membranes are moist.      Pharynx: Oropharynx is clear. Comments: Poor dentition, teeth decay in right upper dentition  Eyes:      Extraocular Movements: Extraocular movements intact. Pupils: Pupils are equal, round, and reactive to light. Cardiovascular:      Rate and Rhythm: Normal rate and regular rhythm. Pulses: Normal pulses. Heart sounds: Normal heart sounds. verbalized agreement understanding. Stable for discharge. EMERGENCY DEPARTMENT COURSE:             No notes of EC Admission Criteria type on file. PROCEDURES:  None    CONSULTS:  None    FINAL IMPRESSION      1.  Dental infection          DISPOSITION / PLAN     DISPOSITION        PATIENT REFERRED TO:  Valarie Peralta MD  [address] [place] [address] [address] [phone]    Schedule an appointment as soon as possible for a visit   For follow up    [facility] ED  [facility] [address] [address] [phone]  Go to   As needed    [address] Villalba [facility]  [address]  [phone]  Schedule an appointment as soon as possible for a visit   For follow up      DISCHARGE MEDICATIONS:  New Prescriptions    CLINDAMYCIN (CLEOCIN) 150 MG CAPSULE    Take 3 capsules by mouth 3 times daily for 7 days       Genaro Lopez MD  Emergency Medicine Resident    (Please note that portions of thisnote were completed with a voice recognition program.  Efforts were made to edit the dictations but occasionally words are mis-transcribed.)        Genaro Lopez MD  Resident  07/12/22 3752

## 2022-10-15 ENCOUNTER — HOSPITAL ENCOUNTER (EMERGENCY)
Age: 57
Discharge: HOME | End: 2022-10-15
Attending: EMERGENCY MEDICINE
Payer: COMMERCIAL

## 2022-10-15 ENCOUNTER — APPOINTMENT (OUTPATIENT)
Dept: GENERAL RADIOLOGY | Age: 57
End: 2022-10-15
Payer: COMMERCIAL

## 2022-10-15 VITALS
WEIGHT: 180 LBS | DIASTOLIC BLOOD PRESSURE: 86 MMHG | OXYGEN SATURATION: 97 % | RESPIRATION RATE: 16 BRPM | HEIGHT: 63 IN | SYSTOLIC BLOOD PRESSURE: 141 MMHG | TEMPERATURE: 97.7 F | HEART RATE: 92 BPM | BODY MASS INDEX: 31.89 KG/M2

## 2022-10-15 DIAGNOSIS — M25.572 ACUTE LEFT ANKLE PAIN: Primary | ICD-10-CM

## 2022-10-15 PROCEDURE — 73610 X-RAY EXAM OF ANKLE: CPT

## 2022-10-15 PROCEDURE — 99283 EMERGENCY DEPT VISIT LOW MDM: CPT

## 2022-10-15 PROCEDURE — 6370000000 HC RX 637 (ALT 250 FOR IP): Performed by: STUDENT IN AN ORGANIZED HEALTH CARE EDUCATION/TRAINING PROGRAM

## 2022-10-15 RX ORDER — IBUPROFEN 800 MG/1
800 TABLET ORAL ONCE
Status: COMPLETED | OUTPATIENT
Start: 2022-10-15 | End: 2022-10-15

## 2022-10-15 RX ADMIN — IBUPROFEN 800 MG: 800 TABLET, FILM COATED ORAL at 12:32

## 2022-10-15 ASSESSMENT — PAIN - FUNCTIONAL ASSESSMENT: PAIN_FUNCTIONAL_ASSESSMENT: 0-10

## 2022-10-15 ASSESSMENT — ENCOUNTER SYMPTOMS
SHORTNESS OF BREATH: 0
BACK PAIN: 0
ABDOMINAL PAIN: 0

## 2022-10-15 ASSESSMENT — PAIN DESCRIPTION - ORIENTATION: ORIENTATION: LEFT

## 2022-10-15 ASSESSMENT — PAIN SCALES - GENERAL: PAINLEVEL_OUTOF10: 10

## 2022-10-15 ASSESSMENT — PAIN DESCRIPTION - LOCATION: LOCATION: ANKLE

## 2022-10-15 ASSESSMENT — PAIN DESCRIPTION - PAIN TYPE: TYPE: ACUTE PAIN;CHRONIC PAIN

## 2022-10-15 NOTE — ED NOTES
Patient brought back from triage at this time     Shanda Gilliam, [facility]  10/15/22 7987

## 2022-10-15 NOTE — ED PROVIDER NOTES
[facility] ED     Emergency Department     Faculty Attestation    I performed a history and physical examination of the patient and discussed management with the resident. I reviewed the residents note and agree with the documented findings and plan of care. Any areas of disagreement are noted on the chart. I was personally present for the key portions of any procedures. I have documented in the chart those procedures where I was not present during the key portions. I have reviewed the emergency nurses triage note. I agree with the chief complaint, past medical history, past surgical history, allergies, medications, social and family history as documented unless otherwise noted below. For Physician Assistant/ Nurse Practitioner cases/documentation I have personally evaluated this patient and have completed at least one if not all key elements of the E/M (history, physical exam, and MDM). Additional findings are as noted. Patient presents with left ankle pain. She says she fractured it several years ago but then twisted it today after she stepped on a pinecone. Says she was able to work 4 hours of her shift as a  but continues to have pain so came in to be seen. She denies any other pain or injury. On exam, patient is resting comfortably in the bed. There is mild tenderness to the left lateral malleolus. Distal pulses and sensation are intact. No deformity, erythema, warmth, edema. We will get x-ray and treat patient's pain.       Tammie Peña MD  Attending Emergency  Physician            Tilman Jeans, MD  10/15/22 5436

## 2022-10-15 NOTE — DISCHARGE INSTRUCTIONS
Take Tylenol and ibuprofen as needed for pain: You can take 800 mg ibuprofen every 8 hours. You can take 1000 mg Tylenol every 8 hours. Please alternate these medications for maximal effect. For example if you take ibuprofen at noon, take Tylenol 4 PM, then repeat ibuprofen 8 PM and so on. Wear Ace bandage as needed for comfort.     Follow-up with PCP this week if symptoms persist.    XR return the ED with worsening pain, numbness, weakness, fever, or other new/concerning symptoms

## 2022-10-15 NOTE — ED PROVIDER NOTES
[facility] ED  Emergency Department Encounter  Emergency Medicine Resident     Pt Name: Natasha Vela  XOW:[id number]  [place] 1965  Date of evaluation: 10/15/22  PCP:  Aramis Stokes MD    [address] [place] [address]       Chief Complaint   Patient presents with    Ankle Pain       HISTORY OF PRESENT ILLNESS  (Location/Symptom, Timing/Onset, Context/Setting, Quality, Duration, ModifyingFactors, Severity.)      Natasha Vela is a 61 y.o. female with PMH of left ankle fracture who presents for evaluation of left ankle injury. Patient was walking today when she stepped on a pinecone and inverted her ankle. Did not fall to the ground or hit her head. Patient was able to go to work and work her full 4-hour shift as a  however is complaining of pain with ambulation. No numbness, weakness, tingling. Has not taken anything for pain. Ankle fracture in 2012 that was managed nonoperatively. Does occasionally have arthritic pain in that ankle. PAST MEDICAL / SURGICAL / SOCIAL /FAMILY HISTORY      has a past medical history of Allergic rhinitis and Seizures ([facility] [address] No other pertinent PMH on review with patient/guardian. has no past surgical history on file. No other pertinent PSH on review with patient/guardian.   Social History     Socioeconomic History    Marital status: Single     Spouse name: Not on file    Number of children: Not on file    Years of education: Not on file    Highest education level: Not on file   Occupational History    Not on file   Tobacco Use    Smoking status: Every Day     Packs/day: 1.00     Years: 30.00     Pack years: 30.00     Types: Cigarettes    Smokeless tobacco: Never   Substance and Sexual Activity    Alcohol use: Yes     Comment: weekends-  beer and liquor    Drug use: No    Sexual activity: Not on file   Other Topics Concern    Not on file   Social History Narrative    Not on file     Social Determinants of Health     Financial Resource Strain: Not on file   Food Insecurity: Not on file   Transportation Needs: Not on file   Physical Activity: Not on file   Stress: Not on file   Social Connections: Not on file   Intimate Partner Violence: Not on file   Housing Stability: Not on file       I counseled the patient against using tobacco products. Family History   Problem Relation Age of Onset    Diabetes Mother      No other pertinent FamHx on review with patient/guardian. Allergies:  Pcn [penicillins]    Home Medications:  Prior to Admission medications    Medication Sig Start Date End Date Taking? Authorizing Provider   nystatin (MYCOSTATIN) 856207 UNIT/GM cream Apply topically 2 times daily. 8/29/21   Assumpta Roman Hutchinson MD   ibuprofen (ADVIL;MOTRIN) 600 MG tablet Take 1 tablet by mouth 3 times daily as needed for Pain 6/6/21 6/11/21  Felipe Clayton MD   acetaminophen (TYLENOL) 500 MG tablet Take 2 tablets by mouth every 6 hours as needed for Pain Do no exceed this dose recommendation 11/23/19   Juanita Caputo, DO   ibuprofen (ADVIL;MOTRIN) 800 MG tablet Take 1 tablet by mouth every 8 hours as needed for Pain Do not exceed dose recommendation 11/23/19   Jaden Caputo, DO   nicotine polacrilex (NICORETTE) 2 MG gum Take 1 each by mouth as needed for Smoking cessation Maximum dose: 24 pieces/24 hours. 10/10/16   Suraj Wayne MD   naproxen (NAPROSYN) 250 MG tablet Take 2 tablets by mouth 2 times daily (with meals) 9/12/16   Leslye Esters, DO   loratadine (CLARITIN) 10 MG tablet Take 1 tablet by mouth daily. 6/16/14   Rashida Erwin MD       REVIEW OF SYSTEMS    (2-9 systems for level 4, 10 ormore for level 5)      Review of Systems   Constitutional:  Negative for activity change. Eyes:  Negative for visual disturbance. Respiratory:  Negative for shortness of breath. Cardiovascular:  Negative for chest pain. Gastrointestinal:  Negative for abdominal pain. Musculoskeletal:  Positive for arthralgias (Left ankle pain). Negative for back pain and neck pain. Skin:  Negative for rash and wound. Allergic/Immunologic: Negative for immunocompromised state. Neurological:  Negative for weakness and numbness. Hematological:  Does not bruise/bleed easily. Psychiatric/Behavioral:  Negative for confusion. PHYSICAL EXAM   (up to 7 for level 4, 8 or more for level 5)      INITIAL VITALS:   BP (!) 141/86   Pulse 92   Temp 97.7 °F (36.5 °C) (Oral)   Resp 16   Ht 5' 3\" (1.6 m)   Wt 180 lb (81.6 kg)   LMP 09/12/2016   SpO2 97%   BMI 31.89 kg/m²     Physical Exam  Constitutional:       General: She is not in acute distress. Appearance: Normal appearance. She is not ill-appearing, toxic-appearing or diaphoretic. HENT:      Head: Normocephalic and atraumatic. Right Ear: External ear normal.      Left Ear: External ear normal.   Eyes:      General:         Right eye: No discharge. Left eye: No discharge. Cardiovascular:      Rate and Rhythm: Normal rate. Pulmonary:      Effort: Pulmonary effort is normal. No respiratory distress. Musculoskeletal:      Comments: Swelling over left lateral malleolus. Minimal tenderness but pain with inversion of ankle. Pedal pulse 2+. Sensation/strength intact. No midline spinal tenderness. Skin:     Coloration: Skin is not jaundiced. Neurological:      General: No focal deficit present. Mental Status: She is alert. DIFFERENTIAL  DIAGNOSIS     PLAN (LABS / IMAGING / EKG):  Orders Placed This Encounter   Procedures    XR ANKLE LEFT (MIN 3 VIEWS)       MEDICATIONS ORDERED:  Orders Placed This Encounter   Medications    ibuprofen (ADVIL;MOTRIN) tablet 800 mg       DIAGNOSTIC RESULTS / EMERGENCY DEPARTMENT COURSE / MDM     LABS:  No results found for this visit on 10/15/22. IMPRESSION/MDM/ED COURSE:  61 y.o. female presented with Swelling over left lateral malleolus. Afebrile. On exam patient resting comfortably in acute distress, nontoxic-appearing. Minimal tenderness but pain with inversion of ankle. Neurovascularly intact. Low suspicion for ankle fracture given previous injury will obtain x-ray. Suspect ankle sprain. Symptomatic treatment with NSAIDs. ED Course as of 10/15/22 1346   Sat Oct 15, 2022   1258 IMPRESSION:  1. Remote healed fracture deformity of the distal fibular metaphysis. 2. Mild soft tissue edema of the anterior ankle. 3. No superimposed acute fracture or dislocation. [AF]   1300 Recommende RICE. Ace wrap applied. Tylenol/NSAIDs as needed for pain. PCP follow-up if symptoms persist.  I discussed signs and symptoms that would require reevaluation in the ED. The patient expressed understanding and agreement with plan. All questions answered. [AF]      ED Course User Index  [AF] Roxnaa Barrett DO       RADIOLOGY:  XR ANKLE LEFT (MIN 3 VIEWS)   Final Result   1. Remote healed fracture deformity of the distal fibular metaphysis. 2. Mild soft tissue edema of the anterior ankle. 3. No superimposed acute fracture or dislocation. EKG  None    All EKG's are interpreted by the Emergency Department Physician who either signs or Co-signs this chart in the absence of a cardiologist.    PROCEDURES:  None    CONSULTS:  None    FINAL IMPRESSION      1.  Acute left ankle pain          DISPOSITION / PLAN     DISPOSITION Decision To Discharge 10/15/2022 12:59:12 PM      PATIENT REFERREDTO:  Han Gaona MD  [address] [place], [place] [address] [phone]    In 3 days  If symptoms persist      DISCHARGE MEDICATIONS:  Discharge Medication List as of 10/15/2022  1:00 PM          Alejandro Lara DO  PGY 3  Resident Physician Emergency Medicine  10/15/22 1:46 PM    (Please note that portions of this note were completed with a voice recognition program.Efforts were made to edit the dictations but occasionally words are mis-transcribed.)        Roxana Barrett DO  Resident  10/15/22 9019

## 2022-10-15 NOTE — ED NOTES
This patient was assessed by the doctor only.  RN processed and completed the orders from this doctor i.e. labs, meds, EKG, etc.     Neva Barry RN  10/15/22 [id number]

## 2024-03-18 ENCOUNTER — HOSPITAL ENCOUNTER (EMERGENCY)
Age: 59
Discharge: HOME | End: 2024-03-18
Attending: EMERGENCY MEDICINE
Payer: COMMERCIAL

## 2024-03-18 VITALS
TEMPERATURE: 98.3 F | RESPIRATION RATE: 20 BRPM | SYSTOLIC BLOOD PRESSURE: 129 MMHG | HEART RATE: 95 BPM | OXYGEN SATURATION: 100 % | DIASTOLIC BLOOD PRESSURE: 90 MMHG

## 2024-03-18 DIAGNOSIS — K02.9 PAIN DUE TO DENTAL CARIES: Primary | ICD-10-CM

## 2024-03-18 PROCEDURE — 99283 EMERGENCY DEPT VISIT LOW MDM: CPT

## 2024-03-18 PROCEDURE — 6370000000 HC RX 637 (ALT 250 FOR IP)

## 2024-03-18 RX ORDER — IBUPROFEN 600 MG/1
600 TABLET ORAL 3 TIMES DAILY PRN
Qty: 15 TABLET | Refills: 0 | Status: SHIPPED | OUTPATIENT
Start: 2024-03-18 | End: 2024-03-23

## 2024-03-18 RX ORDER — CLINDAMYCIN HYDROCHLORIDE 150 MG/1
450 CAPSULE ORAL 3 TIMES DAILY
Qty: 63 CAPSULE | Refills: 0 | Status: SHIPPED | OUTPATIENT
Start: 2024-03-18 | End: 2024-03-25

## 2024-03-18 RX ORDER — ACETAMINOPHEN 500 MG
1000 TABLET ORAL EVERY 6 HOURS PRN
Qty: 30 TABLET | Refills: 0 | Status: SHIPPED | OUTPATIENT
Start: 2024-03-18

## 2024-03-18 RX ORDER — CLINDAMYCIN HYDROCHLORIDE 150 MG/1
450 CAPSULE ORAL ONCE
Status: COMPLETED | OUTPATIENT
Start: 2024-03-18 | End: 2024-03-18

## 2024-03-18 RX ORDER — IBUPROFEN 400 MG/1
400 TABLET ORAL ONCE
Status: COMPLETED | OUTPATIENT
Start: 2024-03-18 | End: 2024-03-18

## 2024-03-18 RX ADMIN — CLINDAMYCIN HYDROCHLORIDE 450 MG: 150 CAPSULE ORAL at 14:38

## 2024-03-18 RX ADMIN — IBUPROFEN 400 MG: 400 TABLET, FILM COATED ORAL at 14:38

## 2024-03-18 ASSESSMENT — ENCOUNTER SYMPTOMS
NAUSEA: 0
SORE THROAT: 0
VOICE CHANGE: 0

## 2024-03-18 ASSESSMENT — PAIN - FUNCTIONAL ASSESSMENT: PAIN_FUNCTIONAL_ASSESSMENT: 0-10

## 2024-03-18 ASSESSMENT — PAIN DESCRIPTION - ORIENTATION: ORIENTATION: LEFT

## 2024-03-18 ASSESSMENT — PAIN SCALES - GENERAL: PAINLEVEL_OUTOF10: 6

## 2024-03-18 ASSESSMENT — PAIN DESCRIPTION - LOCATION: LOCATION: MOUTH

## 2024-03-18 NOTE — DISCHARGE INSTRUCTIONS
Please take your clindamycin as prescribed.     Please follow up with a dentist.     Take your medication as indicated and prescribed.  If you are given an antibiotic, then make sure you get the prescription filled and take the antibiotics until finished.  Drink plenty of water while taking the antibiotics.  Avoid drinking alcohol or drinks that have caffeine in it while taking antibiotics.       For pain use ibuprofen (Motrin / Advil) or acetaminophen (Tylenol), unless prescribed medications that have acetaminophen in it.  You can take over the counter acetaminophen tablets (1 - 2 tablets of the 500-mg strength every 6 hours) or ibuprofen tablets (2 tablets every 4 hours).    PLEASE RETURN TO THE EMERGENCY DEPARTMENT IMMEDIATELY for worsening symptoms, swelling to your face, redness on your face, drainage from the tooth, or if you develop any concerning symptoms such as: high fever not relieved by acetaminophen (Tylenol) and/or ibuprofen (Motrin / Advil), chills, shortness of breath, chest pain, feeling of your heart fluttering or racing, persistent nausea and/or vomiting, vomiting up blood, blood in your stool, numbness, loss of consciousness, weakness or tingling in the arms or legs or change in color of the extremities, changes in mental status, persistent headache, blurry vision, loss of bladder / bowel control, unable to follow up with your physician, or other any other care or concern.

## 2024-03-18 NOTE — ED PROVIDER NOTES
°C), Pulse: 95, Respirations: 20   The patient has no uvular deviation, no palatal elevation, no difficulty speaking, no trismus, no muffled voice, no tongue swelling, no tonsillar abscess.        Comments  Medical Decision Making  Risk  Prescription drug management.    The patient complains of dental pain     Based on history and physical exam they are unlikely to have infection abscess no signs of retropharyngeal abscess Ludewig's angina no signs of abscess or foreign body patient does not have needs for any emergent CT or drainage at this time antibiotics pain control can be given for follow-up with outpatient dentist             Gurinder Whitmore DO, RDMS.  Attending Emergency Physician          Gurinder Whitmore DO  03/18/24 3020    
floor of mouth is soft, tongue is not raised, patient is tolerating secretions, and no voice changes or stridor are noted.    Will plan on analgesia and will start on antibiotics for concerns of potential infection. Will start patient on clindamycin with first dose given in the ED and prescription provided for home.  Instructed to take as prescribed for entire course unless instructed to stop by dentist or another physician. Discussed dental follow up, patient has a dentist to follow up with already.  Patient will be discharged once receiving medications and appointment set up.  Provided instructions to return if severe pain, difficulty swallowing, high fevers, or any other concerns arise.         Risk  OTC drugs.  Prescription drug management.        EKG      All EKG's are interpreted by the Emergency Department Physician who either signs or Co-signs this chart in the absence of a cardiologist.    EMERGENCY DEPARTMENT COURSE:           PROCEDURES:      CONSULTS:  None    CRITICAL CARE:  There was significant risk of life threatening deterioration of patient's condition requiring my direct management. Critical care time  minutes, excluding any documented procedures.    FINAL IMPRESSION      1. Pain due to dental caries          DISPOSITION / PLAN     DISPOSITION Decision To Discharge 03/18/2024 02:34:07 PM      PATIENT REFERRED TO:  Suraj Abdi MD  [facility] [id number] [phone]    Schedule an appointment as soon as possible for a visit       [facility] ED  [address] [facility] [id number] [phone]  Go to   If symptoms worsen      DISCHARGE MEDICATIONS:  Discharge Medication List as of 3/18/2024  2:34 PM        START taking these medications    Details   clindamycin (CLEOCIN) 150 MG capsule Take 3 capsules by mouth 3 times daily for 7 days, Disp-63 capsule, R-0Print             Rohan Mejia MD  Emergency Medicine Resident    (Please note that portions of this

## 2024-11-11 ENCOUNTER — HOSPITAL ENCOUNTER (EMERGENCY)
Age: 59
Discharge: HOME | End: 2024-11-11
Attending: EMERGENCY MEDICINE
Payer: COMMERCIAL

## 2024-11-11 VITALS
RESPIRATION RATE: 16 BRPM | OXYGEN SATURATION: 98 % | DIASTOLIC BLOOD PRESSURE: 101 MMHG | SYSTOLIC BLOOD PRESSURE: 143 MMHG | HEART RATE: 98 BPM | TEMPERATURE: 98.2 F

## 2024-11-11 DIAGNOSIS — K02.9 PAIN DUE TO DENTAL CARIES: Primary | ICD-10-CM

## 2024-11-11 PROCEDURE — 6370000000 HC RX 637 (ALT 250 FOR IP)

## 2024-11-11 PROCEDURE — 99283 EMERGENCY DEPT VISIT LOW MDM: CPT

## 2024-11-11 RX ORDER — ACETAMINOPHEN 325 MG/1
650 TABLET ORAL ONCE
Status: COMPLETED | OUTPATIENT
Start: 2024-11-11 | End: 2024-11-11

## 2024-11-11 RX ORDER — CLINDAMYCIN HYDROCHLORIDE 150 MG/1
450 CAPSULE ORAL 3 TIMES DAILY
Qty: 90 CAPSULE | Refills: 0 | Status: SHIPPED | OUTPATIENT
Start: 2024-11-11 | End: 2024-11-21

## 2024-11-11 RX ORDER — ACETAMINOPHEN 500 MG
500 TABLET ORAL EVERY 6 HOURS PRN
Qty: 56 TABLET | Refills: 0 | Status: SHIPPED | OUTPATIENT
Start: 2024-11-11 | End: 2024-11-25

## 2024-11-11 RX ORDER — IBUPROFEN 400 MG/1
400 TABLET, FILM COATED ORAL EVERY 6 HOURS PRN
Qty: 56 TABLET | Refills: 0 | Status: SHIPPED | OUTPATIENT
Start: 2024-11-11 | End: 2024-11-25

## 2024-11-11 RX ORDER — CLINDAMYCIN HYDROCHLORIDE 150 MG/1
450 CAPSULE ORAL ONCE
Status: COMPLETED | OUTPATIENT
Start: 2024-11-11 | End: 2024-11-11

## 2024-11-11 RX ADMIN — ACETAMINOPHEN 650 MG: 325 TABLET ORAL at 17:33

## 2024-11-11 RX ADMIN — CLINDAMYCIN HYDROCHLORIDE 450 MG: 150 CAPSULE ORAL at 17:33

## 2024-11-11 RX ADMIN — BENZOCAINE 1 EACH: 220 GEL, DENTIFRICE DENTAL at 17:33

## 2024-11-11 ASSESSMENT — PAIN SCALES - GENERAL: PAINLEVEL_OUTOF10: 7

## 2024-11-11 ASSESSMENT — PAIN - FUNCTIONAL ASSESSMENT: PAIN_FUNCTIONAL_ASSESSMENT: 0-10

## 2024-11-11 ASSESSMENT — PAIN DESCRIPTION - LOCATION: LOCATION: TEETH

## 2024-11-11 NOTE — DISCHARGE INSTRUCTIONS
You were seen today for dental pain. On workup it does appear that you have a dental infection. We will give you your first dose of clindamycin here, Tylenol and numbing gel for pain.     We will send prescription for clindamycin. Take 3 pills every 3 times a day for the next 10 days.     We will send prescription for tylenol, ibuprofen, and oragel for pain. You can alternate between taking tylenol and ibuprofen every 6 hours. Do not take Tylenol and ibuprofen at the same time. You can put orajel on the site that is causing you pain.     Seek medical attention if you start to get fevers, chills, worsening pain.     Please follow up with your PCP and dentist when able.    Thank you.

## 2024-11-11 NOTE — ED PROVIDER NOTES
[facility]     Emergency Department     Faculty Attestation    I performed a history and physical examination of the patient and discussed management with the resident. I have reviewed and agree with the resident’s findings including all diagnostic interpretations, and treatment plans as written at the time of my review. Any areas of disagreement are noted on the chart. I was personally present for the key portions of any procedures. I have documented in the chart those procedures where I was not present during the key portions. For Physician Assistant/ Nurse Practitioner cases/documentation I have personally evaluated this patient and have completed at least one if not all key elements of the E/M (history, physical exam, and MDM). Additional findings are as noted.    PtName: Larisa Phelps  MRN: [id number]  Birthdate 1965  Date of evaluation: 11/11/24  Note Started: 5:22 PM EST    Primary Care Physician: Suraj Abdi MD        History: This is a 59 y.o. female who presents to the Emergency Department with complaint of dental pain.  Ongoing for the past several days.  No fever no difficulty swallowing.  The patient states she is having issues finding an oral surgeon who will be able to take her teeth out as she says the dentist will not perform this procedure.    Physical:   oral temperature is 98.2 °F (36.8 °C). Her blood pressure is 143/101 (abnormal) and her pulse is 98. Her respiration is 16 and oxygen saturation is 98%.  Multiple dental caries noted on the upper and lower left side.  No pooling of oral secretions no tongue elevation airways patent.    Impression: Dental pain secondary to dental caries    Plan: Antibiotic, analgesia.      Medical Decision Making  Problems Addressed:  Pain due to dental caries: acute illness or injury    Risk  OTC drugs.  Prescription drug management.            (Please note that portions of this note were 
tablet by mouth daily.  Patient not taking: Reported on 11/11/2024 6/16/14   Yousif Simmons MD       REVIEW OF SYSTEMS       Review of Systems   Constitutional:  Negative for chills and fever.   HENT:  Positive for dental problem. Negative for drooling and mouth sores.        PHYSICAL EXAM      INITIAL VITALS:   BP (!) 143/101   Pulse 98   Temp 98.2 °F (36.8 °C) (Oral)   Resp 16   LMP 09/12/2016   SpO2 98%     Physical Exam  HENT:      Head: Normocephalic and atraumatic.      Mouth/Throat:      Mouth: Mucous membranes are moist.      Pharynx: Posterior oropharyngeal erythema present. No oropharyngeal exudate.      Comments: Mildly erythematous gums, dental caries on back left molars.  No abscess noted.           DDX/DIAGNOSTIC RESULTS / EMERGENCY DEPARTMENT COURSE / MDM     Medical Decision Making  Risk  OTC drugs.  Prescription drug management.        EKG  N/A    All EKG's are interpreted by the Emergency Department Physician who either signs or Co-signs this chart in the absence of a cardiologist.    EMERGENCY DEPARTMENT COURSE:      ED Course as of 11/11/24 1948 Mon Nov 11, 2024   1646 Discussed case with attending- likely dental infection, no abscess located  Will give patient first course antibiotics clindamycin, lidogel and tylenol for pain    Discussed with patient plan- ok for discharge. Can take tylenol and motrin every 6 hours for pain but not at same time. Discussed need to alternate pain meds. Lidocaine gel for mouth. Clindamycin 3 times daily for 10 days. Will need to follow up with her PCP for her prescription creams and dentist as will likely need teeth extraction.  [KB]      ED Course User Index  [KB] Ruby Hall MD       PROCEDURES:  None    CONSULTS:  None    CRITICAL CARE:  There was significant risk of life threatening deterioration of patient's condition requiring my direct management. Critical care time 0 minutes, excluding any documented procedures.    FINAL IMPRESSION      1.

## 2024-11-11 NOTE — ED NOTES
Pt arrived with report of dental pain   Pt denies any other concerns. Tenderness reported  Pt A&OX4, RR even/unlabored

## 2025-01-31 ENCOUNTER — HOSPITAL ENCOUNTER (EMERGENCY)
Age: 60
Discharge: HOME | End: 2025-01-31
Attending: EMERGENCY MEDICINE
Payer: COMMERCIAL

## 2025-01-31 VITALS
TEMPERATURE: 98.6 F | DIASTOLIC BLOOD PRESSURE: 91 MMHG | SYSTOLIC BLOOD PRESSURE: 148 MMHG | RESPIRATION RATE: 16 BRPM | HEART RATE: 80 BPM | OXYGEN SATURATION: 100 %

## 2025-01-31 DIAGNOSIS — K02.9 PAIN DUE TO DENTAL CARIES: Primary | ICD-10-CM

## 2025-01-31 PROCEDURE — 99284 EMERGENCY DEPT VISIT MOD MDM: CPT | Performed by: EMERGENCY MEDICINE

## 2025-01-31 PROCEDURE — 6360000002 HC RX W HCPCS

## 2025-01-31 PROCEDURE — 6370000000 HC RX 637 (ALT 250 FOR IP)

## 2025-01-31 PROCEDURE — 96372 THER/PROPH/DIAG INJ SC/IM: CPT | Performed by: EMERGENCY MEDICINE

## 2025-01-31 RX ORDER — ACETAMINOPHEN 500 MG
1000 TABLET ORAL ONCE
Status: COMPLETED | OUTPATIENT
Start: 2025-01-31 | End: 2025-01-31

## 2025-01-31 RX ORDER — KETOROLAC TROMETHAMINE 10 MG/1
10 TABLET, FILM COATED ORAL EVERY 6 HOURS PRN
Qty: 8 TABLET | Refills: 0 | Status: SHIPPED | OUTPATIENT
Start: 2025-01-31 | End: 2025-02-02

## 2025-01-31 RX ORDER — KETOROLAC TROMETHAMINE 30 MG/ML
30 INJECTION, SOLUTION INTRAMUSCULAR; INTRAVENOUS ONCE
Status: COMPLETED | OUTPATIENT
Start: 2025-01-31 | End: 2025-01-31

## 2025-01-31 RX ORDER — ACETAMINOPHEN 325 MG/1
650 TABLET ORAL EVERY 6 HOURS PRN
Qty: 30 TABLET | Refills: 0 | Status: SHIPPED | OUTPATIENT
Start: 2025-01-31

## 2025-01-31 RX ORDER — CLINDAMYCIN HYDROCHLORIDE 300 MG/1
300 CAPSULE ORAL 4 TIMES DAILY
Qty: 28 CAPSULE | Refills: 0 | Status: SHIPPED | OUTPATIENT
Start: 2025-01-31 | End: 2025-02-07

## 2025-01-31 RX ORDER — CLINDAMYCIN HYDROCHLORIDE 150 MG/1
300 CAPSULE ORAL ONCE
Status: COMPLETED | OUTPATIENT
Start: 2025-01-31 | End: 2025-01-31

## 2025-01-31 RX ADMIN — CLINDAMYCIN HYDROCHLORIDE 300 MG: 150 CAPSULE ORAL at 08:19

## 2025-01-31 RX ADMIN — ACETAMINOPHEN 1000 MG: 500 TABLET ORAL at 08:19

## 2025-01-31 RX ADMIN — KETOROLAC TROMETHAMINE 30 MG: 30 INJECTION, SOLUTION INTRAMUSCULAR; INTRAVENOUS at 08:19

## 2025-01-31 ASSESSMENT — ENCOUNTER SYMPTOMS
SHORTNESS OF BREATH: 0
SORE THROAT: 0
NAUSEA: 0
TROUBLE SWALLOWING: 0
VOMITING: 0
FACIAL SWELLING: 1

## 2025-01-31 ASSESSMENT — PAIN SCALES - GENERAL: PAINLEVEL_OUTOF10: 10

## 2025-01-31 NOTE — DISCHARGE INSTRUCTIONS
You were seen in the emergency department for dental pain.  Your vital signs were stable.  No fever noted.  No dental abscesses.  We gave you your first dose of antibiotics and pain meds in the emergency department.    I have sent the rest of the antibiotics to the pharmacy.  Please take these as prescribed.  I have also sent Toradol and Tylenol to the pharmacy as well.  Please be sure to eat something when taking these medications as they can be hard on your stomach.    Please call your insurance to see which dental offices accept your insurance.  I have also provided you with a list of dental clinics.  You may try them to see if they can get you in to see a dentist as well.    Please follow-up with your primary care provider in 1 week given recent ER visit.      Please return the emergency department if your pain returns or worsens or if you develop any difficulty breathing, sore throat, fevers, or facial swelling.

## 2025-01-31 NOTE — ED PROVIDER NOTES
[facility] EMERGENCY DEPARTMENT  Emergency Department Encounter  Emergency Medicine Resident     Pt Name:Larisa Phelps  MRN: [id number]  Birthdate 1965  Date of evaluation: 1/31/25  PCP:  Suraj Abdi MD  Note Started: 8:03 AM EST      CHIEF COMPLAINT       Chief Complaint   Patient presents with    Dental Pain       HISTORY OF PRESENT ILLNESS  (Location/Symptom, Timing/Onset, Context/Setting, Quality, Duration, Modifying Factors, Severity.)      Larisa Phelps is a 59 y.o. female who presents with left lower molar dental pain.  Patient states that this has been off and on for the past several months.  She has been meaning to make an appointment with an oral surgeon but has not had time.  Pain started getting worse a couple days ago.  Patient has been using Orajel with minimal improvement in her pain.  She denies any fevers or chills.  She is tolerating oral intake.  Denies any difficulty swallowing or shortness of breath.  She did notice a little bit of facial swelling to her left jaw.    PAST MEDICAL / SURGICAL / SOCIAL / FAMILY HISTORY      has a past medical history of Allergic rhinitis and Seizures ([facility]).     has no past surgical history on file.    Social History     Socioeconomic History    Marital status: Single     Spouse name: Not on file    Number of children: Not on file    Years of education: Not on file    Highest education level: Not on file   Occupational History    Not on file   Tobacco Use    Smoking status: Every Day     Current packs/day: 1.00     Average packs/day: 1 pack/day for 30.0 years (30.0 ttl pk-yrs)     Types: Cigarettes    Smokeless tobacco: Never   Substance and Sexual Activity    Alcohol use: Yes     Comment: weekends-  beer and liquor    Drug use: No    Sexual activity: Not on file   Other Topics Concern    Not on file   Social History Narrative    Not on file     Social Determinants of Health     Financial Resource Strain: Not on file   Food  80   Temp 98.6 °F (37 °C)   Resp 16   LMP 09/12/2016   SpO2 100%     Physical Exam  Constitutional:       General: She is not in acute distress.     Appearance: She is not ill-appearing or toxic-appearing.   HENT:      Head: Normocephalic and atraumatic.      Comments: No mastoid tenderness bilaterally.  Mild swelling along the left mandible.  No submandibular swelling.     Right Ear: External ear normal.      Left Ear: External ear normal.      Nose: Nose normal.      Mouth/Throat:      Mouth: Mucous membranes are moist.      Dentition: Dental caries present. No dental abscesses.      Pharynx: Oropharynx is clear. Uvula midline. No pharyngeal swelling, oropharyngeal exudate, posterior oropharyngeal erythema or uvula swelling.      Tonsils: No tonsillar exudate or tonsillar abscesses.     Eyes:      Extraocular Movements: Extraocular movements intact.      Conjunctiva/sclera: Conjunctivae normal.   Cardiovascular:      Rate and Rhythm: Normal rate.      Pulses: Normal pulses.      Heart sounds: Normal heart sounds.   Pulmonary:      Effort: Pulmonary effort is normal.   Musculoskeletal:         General: Normal range of motion.      Cervical back: Normal range of motion and neck supple. No rigidity or tenderness.   Lymphadenopathy:      Cervical: No cervical adenopathy.   Skin:     Findings: No bruising, erythema, lesion or rash.   Neurological:      General: No focal deficit present.      Mental Status: She is alert and oriented to person, place, and time.       DDX/DIAGNOSTIC RESULTS / EMERGENCY DEPARTMENT COURSE / MDM     Medical Decision Making  59-year-old female who presents with left lower molar dental pain.  Patient states that this has been off and on for the past several months.  She has been meaning to make an appointment with an oral surgeon but has not had time.  Pain started getting worse a couple days ago.  Patient has been using Orajel with minimal improvement in her pain.  She denies any fevers

## 2025-01-31 NOTE — ED PROVIDER NOTES
[facility] EMERGENCY DEPARTMENT     Emergency Department     Faculty Attestation        I performed a history and physical examination of the patient and discussed management with the resident. I reviewed the resident’s note and agree with the documented findings and plan of care. Any areas of disagreement are noted on the chart. I was personally present for the key portions of any procedures. I have documented in the chart those procedures where I was not present during the key portions. I have reviewed the emergency nurses triage note. I agree with the chief complaint, past medical history, past surgical history, allergies, medications, social and family history as documented unless otherwise noted below.    For mid-level providers such as nurse practitioners as well as physicians assistants:    I have personally seen and evaluated the patient.    I find the patient's history and physical exam are consistent with NP/PA documentation.  I agree with the care provided, treatment rendered, disposition, & follow-up plan.     Additional findings are as noted.    Vital Signs: BP (!) 148/91   Pulse 80   Temp 98.6 °F (37 °C)   Resp 16   LMP 09/12/2016   SpO2 100%   PCP:  Suraj Abdi MD    Pertinent Comments:           Critical Care  None          Abrahan Sumner MD    Attending Emergency Medicine Physician            Quirino Sumner MD  01/31/25 0819

## 2025-03-14 ENCOUNTER — HOSPITAL ENCOUNTER (EMERGENCY)
Age: 60
Discharge: HOME | End: 2025-03-14
Attending: EMERGENCY MEDICINE
Payer: COMMERCIAL

## 2025-03-14 VITALS
RESPIRATION RATE: 19 BRPM | HEART RATE: 98 BPM | OXYGEN SATURATION: 100 % | SYSTOLIC BLOOD PRESSURE: 126 MMHG | DIASTOLIC BLOOD PRESSURE: 91 MMHG | WEIGHT: 180 LBS | BODY MASS INDEX: 31.89 KG/M2 | HEIGHT: 63 IN | TEMPERATURE: 98.2 F

## 2025-03-14 DIAGNOSIS — K08.89 PAIN, DENTAL: ICD-10-CM

## 2025-03-14 DIAGNOSIS — K14.0 TONGUE ULCER: Primary | ICD-10-CM

## 2025-03-14 PROCEDURE — 96372 THER/PROPH/DIAG INJ SC/IM: CPT

## 2025-03-14 PROCEDURE — 6360000002 HC RX W HCPCS: Performed by: EMERGENCY MEDICINE

## 2025-03-14 PROCEDURE — 6370000000 HC RX 637 (ALT 250 FOR IP)

## 2025-03-14 PROCEDURE — 99284 EMERGENCY DEPT VISIT MOD MDM: CPT

## 2025-03-14 PROCEDURE — 6370000000 HC RX 637 (ALT 250 FOR IP): Performed by: EMERGENCY MEDICINE

## 2025-03-14 RX ORDER — ACETAMINOPHEN 500 MG
1000 TABLET ORAL 4 TIMES DAILY PRN
Qty: 40 TABLET | Refills: 0 | Status: SHIPPED | OUTPATIENT
Start: 2025-03-14 | End: 2025-03-19

## 2025-03-14 RX ORDER — CLINDAMYCIN HYDROCHLORIDE 150 MG/1
300 CAPSULE ORAL ONCE
Status: COMPLETED | OUTPATIENT
Start: 2025-03-14 | End: 2025-03-14

## 2025-03-14 RX ORDER — ACETAMINOPHEN 500 MG
1000 TABLET ORAL ONCE
Status: COMPLETED | OUTPATIENT
Start: 2025-03-14 | End: 2025-03-14

## 2025-03-14 RX ORDER — KETOROLAC TROMETHAMINE 15 MG/ML
15 INJECTION, SOLUTION INTRAMUSCULAR; INTRAVENOUS ONCE
Status: COMPLETED | OUTPATIENT
Start: 2025-03-14 | End: 2025-03-14

## 2025-03-14 RX ORDER — CLINDAMYCIN HYDROCHLORIDE 150 MG/1
150 CAPSULE ORAL ONCE
Status: COMPLETED | OUTPATIENT
Start: 2025-03-14 | End: 2025-03-14

## 2025-03-14 RX ORDER — CLINDAMYCIN HYDROCHLORIDE 150 MG/1
450 CAPSULE ORAL 3 TIMES DAILY
Qty: 63 CAPSULE | Refills: 0 | Status: SHIPPED | OUTPATIENT
Start: 2025-03-14 | End: 2025-03-21

## 2025-03-14 RX ADMIN — CLINDAMYCIN HYDROCHLORIDE 300 MG: 150 CAPSULE ORAL at 17:14

## 2025-03-14 RX ADMIN — CLINDAMYCIN HYDROCHLORIDE 150 MG: 150 CAPSULE ORAL at 16:57

## 2025-03-14 RX ADMIN — ACETAMINOPHEN 1000 MG: 500 TABLET ORAL at 16:56

## 2025-03-14 RX ADMIN — KETOROLAC TROMETHAMINE 15 MG: 15 INJECTION, SOLUTION INTRAMUSCULAR; INTRAVENOUS at 16:57

## 2025-03-14 ASSESSMENT — PAIN SCALES - GENERAL
PAINLEVEL_OUTOF10: 8
PAINLEVEL_OUTOF10: 10

## 2025-03-14 ASSESSMENT — ENCOUNTER SYMPTOMS
VOMITING: 0
SORE THROAT: 0
NAUSEA: 0
VOICE CHANGE: 0

## 2025-03-14 ASSESSMENT — LIFESTYLE VARIABLES
HOW MANY STANDARD DRINKS CONTAINING ALCOHOL DO YOU HAVE ON A TYPICAL DAY: PATIENT DOES NOT DRINK
HOW OFTEN DO YOU HAVE A DRINK CONTAINING ALCOHOL: NEVER

## 2025-03-14 ASSESSMENT — PAIN - FUNCTIONAL ASSESSMENT: PAIN_FUNCTIONAL_ASSESSMENT: 0-10

## 2025-03-14 ASSESSMENT — PAIN DESCRIPTION - LOCATION: LOCATION: TEETH;MOUTH

## 2025-03-14 NOTE — DISCHARGE INSTRUCTIONS
You are seen today for tongue pain, tooth pain, found to have left lateral tongue ulcer and minimal swelling over the left lower gum area, not fluctuant.    You are given antibiotic first dose in the emergency department with analgesics.    You are given prescription for antibiotic and supportive medications, take according to prescription.    Schedule appointment with your primary care physician for further follow-up.  Follow-up with the scheduled appointment with your dentist coming up in May.     Please follow the instructions to come back to emergency department

## 2025-03-14 NOTE — ED PROVIDER NOTES
[facility]     Emergency Department     Faculty Note/ Attestation      Pt Name: Larisa Phelps                                       MRN: [id number]  Birthdate 1965  Date of evaluation: 3/14/2025  Note Started: 4:59 PM EDT    Patients PCP:    Suraj Abdi MD    Attestation  I performed a history and physical examination of the patient and discussed management with the resident. I reviewed the resident’s note and agree with the documented findings and plan of care. Any areas of disagreement are noted on the chart. I was personally present for the key portions of any procedures. I have documented in the chart those procedures where I was not present during the key portions. I have reviewed the emergency nurses triage note. I agree with the chief complaint, past medical history, past surgical history, allergies, medications, social and family history as documented unless otherwise noted below.    For Physician Assistant/ Nurse Practitioner cases/documentation I have personally evaluated this patient and have completed at least one if not all key elements of the E/M (history, physical exam, and MDM). Additional findings are as noted.    Initial Screens:        Gunter Coma Scale  Eye Opening: Spontaneous  Best Verbal Response: Oriented  Best Motor Response: Obeys commands  Gunter Coma Scale Score: 15    Vitals:    Vitals:    03/14/25 1615   BP: (!) 126/91   Pulse: 98   Resp: 19   Temp: 98.2 °F (36.8 °C)   SpO2: 100%   Weight: 81.6 kg (180 lb)   Height: 1.6 m (5' 3\")       CHIEF COMPLAINT       Chief Complaint   Patient presents with    Dental Pain     Patient 59-year-old female with poor dentition not getting follow-up with a dentist for pulling her teeth until May patient noting increasing pain irritation of the tongue as well as the outside of the jawline feeling a small bump on the side is not fluctuant at this time however could develop an abscess clindamycin will be given

## 2025-03-14 NOTE — ED PROVIDER NOTES
[facility] EMERGENCY DEPARTMENT  Emergency Department Encounter  Emergency Medicine Resident     Pt Name:Larisa Phelps  MRN: [id number]  Birthdate 1965  Date of evaluation: 3/14/25  PCP:  Suraj Abdi MD  Note Started: 4:39 PM EDT      CHIEF COMPLAINT       Chief Complaint   Patient presents with    Dental Pain       HISTORY OF PRESENT ILLNESS  (Location/Symptom, Timing/Onset, Context/Setting, Quality, Duration, Modifying Factors, Severity.)      Larisa Phelps is a 59 y.o. female who presents with pain in the left mid to anterior of the tongue, unable to eat or drink properly due to pain, pain is there for last 1 week, progressively worsening, she denies swelling over the face or gum.  She was on antibiotic for dental infection 1 month ago, she has an appointment coming up with dentist in May.  She denies fever, nausea, vomiting, focal weakness, headache, vision change, voice change.    PAST MEDICAL / SURGICAL / SOCIAL / FAMILY HISTORY      has a past medical history of Allergic rhinitis and Seizures ([facility]).       has no past surgical history on file.      Social History     Socioeconomic History    Marital status: Single     Spouse name: Not on file    Number of children: Not on file    Years of education: Not on file    Highest education level: Not on file   Occupational History    Not on file   Tobacco Use    Smoking status: Every Day     Current packs/day: 1.00     Average packs/day: 1 pack/day for 30.0 years (30.0 ttl pk-yrs)     Types: Cigarettes    Smokeless tobacco: Never   Substance and Sexual Activity    Alcohol use: Yes     Comment: weekends-  beer and liquor    Drug use: No    Sexual activity: Not on file   Other Topics Concern    Not on file   Social History Narrative    Not on file     Social Drivers of Health     Financial Resource Strain: Not on file   Food Insecurity: Not on file   Transportation Needs: Not on file   Physical Activity: Not on file   Stress: Not on file  for dental problem. Negative for drooling, sore throat and voice change.    Gastrointestinal:  Negative for nausea and vomiting.   Genitourinary:  Negative for dysuria.   Neurological:  Negative for dizziness, syncope and weakness.       PHYSICAL EXAM      INITIAL VITALS:   BP (!) 126/91   Pulse 98   Temp 98.2 °F (36.8 °C)   Resp 19   Ht 1.6 m (5' 3\")   Wt 81.6 kg (180 lb)   LMP 09/12/2016   SpO2 100%   BMI 31.89 kg/m²     Physical Exam  HENT:      Head: Normocephalic.      Right Ear: External ear normal.      Left Ear: External ear normal.      Nose: Nose normal.      Mouth/Throat:      Pharynx: Oropharynx is clear.      Comments: Ulcer over the left lateral tongue surface.     Left lower second molar tooth is tender to touch, not loosened, minimal swelling over the gum area, no fluctuance, no obvious bulging.    Left lower first molar tooth is sharp to touch, particularly medial borders, potentially irritating and injuring tongue    No active bleeding from the tongue.    No swelling over floor of the mouth  Cardiovascular:      Pulses: Normal pulses.   Pulmonary:      Effort: Pulmonary effort is normal.   Abdominal:      Palpations: Abdomen is soft.      Tenderness: There is no abdominal tenderness.   Musculoskeletal:         General: Normal range of motion.      Comments: Full mouth opening, no temporomandibular joint tenderness bilaterally   Skin:     General: Skin is warm and dry.   Neurological:      General: No focal deficit present.      Mental Status: She is alert and oriented to person, place, and time.      Motor: No weakness.           DDX/DIAGNOSTIC RESULTS / EMERGENCY DEPARTMENT COURSE / MDM     Medical Decision Making  Based upon history and physical examination, denies change in visual acuity tongue ulcer over the left lateral lower surface of the tongue, no active bleeding or swelling, minimal tenderness over the third upper molar tooth, and sharp margins of the second upper molar tooth

## 2025-07-11 ENCOUNTER — HOSPITAL ENCOUNTER (EMERGENCY)
Age: 60
Discharge: HOME | End: 2025-07-11
Attending: EMERGENCY MEDICINE
Payer: COMMERCIAL

## 2025-07-11 VITALS
RESPIRATION RATE: 18 BRPM | TEMPERATURE: 97.5 F | HEART RATE: 95 BPM | DIASTOLIC BLOOD PRESSURE: 79 MMHG | OXYGEN SATURATION: 100 % | SYSTOLIC BLOOD PRESSURE: 123 MMHG

## 2025-07-11 DIAGNOSIS — B35.3 TINEA PEDIS OF RIGHT FOOT: ICD-10-CM

## 2025-07-11 DIAGNOSIS — K08.89 PAIN, DENTAL: Primary | ICD-10-CM

## 2025-07-11 PROCEDURE — 6370000000 HC RX 637 (ALT 250 FOR IP)

## 2025-07-11 PROCEDURE — 99283 EMERGENCY DEPT VISIT LOW MDM: CPT

## 2025-07-11 RX ORDER — CLINDAMYCIN HYDROCHLORIDE 300 MG/1
300 CAPSULE ORAL 3 TIMES DAILY
Qty: 21 CAPSULE | Refills: 0 | Status: SHIPPED | OUTPATIENT
Start: 2025-07-11 | End: 2025-07-18

## 2025-07-11 RX ORDER — KETOCONAZOLE 20 MG/G
CREAM TOPICAL
Qty: 30 G | Refills: 1 | Status: SHIPPED | OUTPATIENT
Start: 2025-07-11 | End: 2025-07-11

## 2025-07-11 RX ORDER — CLINDAMYCIN HYDROCHLORIDE 150 MG/1
300 CAPSULE ORAL ONCE
Status: COMPLETED | OUTPATIENT
Start: 2025-07-11 | End: 2025-07-11

## 2025-07-11 RX ORDER — KETOCONAZOLE 20 MG/G
CREAM TOPICAL
Qty: 30 G | Refills: 1 | Status: SHIPPED | OUTPATIENT
Start: 2025-07-11

## 2025-07-11 RX ORDER — ACETAMINOPHEN 500 MG
1000 TABLET ORAL 4 TIMES DAILY PRN
Qty: 40 TABLET | Refills: 0 | Status: SHIPPED | OUTPATIENT
Start: 2025-07-11 | End: 2025-07-16

## 2025-07-11 RX ADMIN — CLINDAMYCIN HYDROCHLORIDE 300 MG: 150 CAPSULE ORAL at 17:21

## 2025-07-11 RX ADMIN — BENZOCAINE: 220 GEL, DENTIFRICE DENTAL at 17:23

## 2025-07-11 ASSESSMENT — ENCOUNTER SYMPTOMS
SHORTNESS OF BREATH: 0
ABDOMINAL PAIN: 0
COUGH: 0

## 2025-07-11 NOTE — ED PROVIDER NOTES
[facility]     Emergency Department     Faculty Note/ Attestation      Pt Name: Larisa Phelps                                       MRN: [id number]  Birthdate 1965  Date of evaluation: 7/11/2025    Patients PCP:    Suraj Abdi MD    Note Started: 5:15 PM EDT      Attestation  I performed a history and physical examination of the patient and discussed management with the resident. I reviewed the resident’s note and agree with the documented findings and plan of care. Any areas of disagreement are noted on the chart. I was personally present for the key portions of any procedures. I have documented in the chart those procedures where I was not present during the key portions. I have reviewed the emergency nurses triage note. I agree with the chief complaint, past medical history, past surgical history, allergies, medications, social and family history as documented unless otherwise noted below.    For Physician Assistant/ Nurse Practitioner cases/documentation I have personally evaluated this patient and have completed at least one if not all key elements of the E/M (history, physical exam, and MDM). Additional findings are as noted.      Initial Screens:        Danny Coma Scale  Eye Opening: Spontaneous  Best Verbal Response: Oriented  Best Motor Response: Obeys commands  [place] Coma Scale Score: 15    Vitals:    Vitals:    07/11/25 1655   BP: 123/79   Pulse: (!) 105   Resp: 18   Temp: 97.5 °F (36.4 °C)   TempSrc: Oral   SpO2: 99%       CHIEF COMPLAINT       Chief Complaint   Patient presents with    Dental Pain             DIAGNOSTIC RESULTS             RADIOLOGY:   No orders to display         LABS:  Labs Reviewed - No data to display      EMERGENCY DEPARTMENT COURSE:     -------------------------  BP: 123/79, Temp: 97.5 °F (36.4 °C), Pulse: (!) 105, Respirations: 18      Comments    60-year-old with moist macerated webspaces on the right foot, has been trying topical

## 2025-07-11 NOTE — DISCHARGE INSTRUCTIONS
Call today or tomorrow for a follow up with your dentist tomorrow, [facility] or [facility] at [phone] or one of the dentists provided for follow up.    Take your medication as indicated, if you are given an antibiotic then make sure you get the prescription filled and take the antibiotics until finished.  Drink plenty of water while taking the antibiotics.  Avoid drinking alcohol while taking antibiotics.     [facility], [employer], [employer] has some antibiotics for free; [facility] and [facility] has a 4 dollar prescription plan for some antibiotics.    Use ibuprofen or Tylenol (unless prescribed medications that have Tylenol in it) for pain.  You can take over the counter Ibuprofen (advil) tablets (4 every 8 hours or 3 every 6 hours or 2 every 4 hours).  You can also use over the counter orajel as directed.    Return to the Emergency Department for fever > 101.5, swelling to face, redness on face, drainage from tooth, any other care or concern.

## 2025-07-12 NOTE — ED PROVIDER NOTES
[facility] EMERGENCY DEPARTMENT  Emergency Department Encounter  Emergency Medicine Resident     Pt Name:Larisa Phelps  MRN: [id number]  Birthdate 1965  Date of evaluation: 7/11/25  PCP:  Suraj Abdi MD  Note Started: 10:25 PM EDT      CHIEF COMPLAINT       Chief Complaint   Patient presents with    Dental Pain       HISTORY OF PRESENT ILLNESS  (Location/Symptom, Timing/Onset, Context/Setting, Quality, Duration, Modifying Factors, Severity.)      Larisa Phelps is a 60 y.o. female who presents with 2 complaints.  Patient has been complaining of left-sided upper and lower dental pain.  Has been going on for several weeks.  Has not been taking any analgesics.  Denies any swelling or irritation.  No difficulty handling food or secretions.  Has an appointment with her dentist in a month.  No drainage.  Also complaining of a rash between her 4th and 5th digit of her right foot.  She has had this for several weeks now.  She has attempted to use Vaseline and a triple antibiotic ointment without improvement in the rash.  She denies any fever, chills, sweats, nausea, vomiting, change in phonation, difficulty handling secretions, abdominal pain, chest pain, shortness of breath, change in bowel or urinary habits.    PAST MEDICAL / SURGICAL / SOCIAL / FAMILY HISTORY      has a past medical history of Allergic rhinitis and Seizures ([facility]).     has no past surgical history on file.    Social History     Socioeconomic History    Marital status: Single     Spouse name: Not on file    Number of children: Not on file    Years of education: Not on file    Highest education level: Not on file   Occupational History    Not on file   Tobacco Use    Smoking status: Every Day     Current packs/day: 1.00     Average packs/day: 1 pack/day for 30.0 years (30.0 ttl pk-yrs)     Types: Cigarettes    Smokeless tobacco: Never   Substance and Sexual Activity    Alcohol use: Yes     Comment: weekends-  beer and

## 2025-07-24 ENCOUNTER — HOSPITAL ENCOUNTER (EMERGENCY)
Age: 60
Discharge: HOME | End: 2025-07-24
Attending: EMERGENCY MEDICINE
Payer: COMMERCIAL

## 2025-07-24 VITALS
SYSTOLIC BLOOD PRESSURE: 114 MMHG | DIASTOLIC BLOOD PRESSURE: 92 MMHG | RESPIRATION RATE: 16 BRPM | OXYGEN SATURATION: 100 % | TEMPERATURE: 99.1 F | HEART RATE: 103 BPM

## 2025-07-24 DIAGNOSIS — K04.7 DENTAL ABSCESS: Primary | ICD-10-CM

## 2025-07-24 DIAGNOSIS — K08.89 PAIN, DENTAL: ICD-10-CM

## 2025-07-24 PROCEDURE — 99283 EMERGENCY DEPT VISIT LOW MDM: CPT

## 2025-07-24 PROCEDURE — 6370000000 HC RX 637 (ALT 250 FOR IP)

## 2025-07-24 RX ORDER — CLINDAMYCIN HYDROCHLORIDE 150 MG/1
150 CAPSULE ORAL 3 TIMES DAILY
Qty: 90 CAPSULE | Refills: 0 | Status: ON HOLD | OUTPATIENT
Start: 2025-07-24 | End: 2025-08-23

## 2025-07-24 RX ORDER — HYDROCODONE BITARTRATE AND ACETAMINOPHEN 5; 325 MG/1; MG/1
1 TABLET ORAL ONCE
Status: COMPLETED | OUTPATIENT
Start: 2025-07-24 | End: 2025-07-24

## 2025-07-24 RX ORDER — IBUPROFEN 800 MG/1
400 TABLET, FILM COATED ORAL 2 TIMES DAILY PRN
Qty: 60 TABLET | Refills: 0 | Status: ON HOLD | OUTPATIENT
Start: 2025-07-24

## 2025-07-24 RX ORDER — HYDROCODONE BITARTRATE AND ACETAMINOPHEN 5; 325 MG/1; MG/1
1 TABLET ORAL EVERY 6 HOURS PRN
Qty: 4 TABLET | Refills: 0 | Status: SHIPPED | OUTPATIENT
Start: 2025-07-24 | End: 2025-07-26

## 2025-07-24 RX ADMIN — HYDROCODONE BITARTRATE AND ACETAMINOPHEN 1 TABLET: 5; 325 TABLET ORAL at 12:11

## 2025-07-24 ASSESSMENT — PAIN - FUNCTIONAL ASSESSMENT: PAIN_FUNCTIONAL_ASSESSMENT: 0-10

## 2025-07-24 ASSESSMENT — PAIN DESCRIPTION - LOCATION: LOCATION: TEETH

## 2025-07-24 ASSESSMENT — PAIN SCALES - GENERAL: PAINLEVEL_OUTOF10: 10

## 2025-07-24 NOTE — ED PROVIDER NOTES
[facility] EMERGENCY DEPARTMENT  Emergency Department Encounter  Emergency Medicine Resident     Pt Name:Larisa Phelps  MRN: [id number]  Birthdate 1965  Date of evaluation: 7/24/25  PCP:  Suraj Abdi MD  Note Started: 11:31 AM EDT      CHIEF COMPLAINT       Chief Complaint   Patient presents with    Dental Pain       HISTORY OF PRESENT ILLNESS  (Location/Symptom, Timing/Onset, Context/Setting, Quality, Duration, Modifying Factors, Severity.)      Larisa Phelps is a 60 y.o. female who presents with dental pain.  Dental pain has been going on since January patient admits to coming to the ED in January March and last visit was July 11.  Patient states that she has a dentist and has a procedure scheduled for August 7 for removal of teeth 17 and 18.  Patient presents today due to continued pain as well as new onset left mandibular swelling.  Patient states that she is still able to chew and swallow.  Pain is a constant 10.  Patient denies changes in voice.    Patient denies any past medical history, patient denies taking any medications, patient denies any illicit drugs such as heroin meth cocaine or marijuana, patient denies smoking, patient denies alcohol use.    PAST MEDICAL / SURGICAL / SOCIAL / FAMILY HISTORY      has a past medical history of Allergic rhinitis and Seizures ([facility]).     has no past surgical history on file.    Social History     Socioeconomic History    Marital status: Single     Spouse name: Not on file    Number of children: Not on file    Years of education: Not on file    Highest education level: Not on file   Occupational History    Not on file   Tobacco Use    Smoking status: Every Day     Current packs/day: 1.00     Average packs/day: 1 pack/day for 30.0 years (30.0 ttl pk-yrs)     Types: Cigarettes    Smokeless tobacco: Never   Substance and Sexual Activity    Alcohol use: Yes     Comment: weekends-  beer and liquor    Drug use: No    Sexual activity: Not on

## 2025-07-24 NOTE — DISCHARGE INSTRUCTIONS
You were seen in the emergency department at [facility] for dental pain and swelling.  You were given some hydrocodone for pain as well as a 10-day course of clindamycin.  It is recommended to follow-up with your dentist at your dental appointment on the seventh.  If any of the following occur please do not hesitate to return to the emergency department.  Increased swelling, fever, inability to swallow liquids or solids, nausea vomiting, difficulty with neck movement, uncontrolled drooling or change in voice.  Also if you have any other concerns please do not hesitate to return to the emergency department.

## 2025-07-24 NOTE — ED TRIAGE NOTES
Pt comes to ED with c/o dental pain. Pt states having appointment for tooth that needs to be removed, has had pain since November 2024, has come multiple times this year, last visit to ED was about 10 days ago, has appointment next month, pain has worsened in the mean time, has tried OTC medications including Orajel with no relief. Pt denies CP, SOB, fever, NVD. Pt a/o x4, resting on stretcher, RR even and unlabored, call light within reach, Dr Simmons at bedside.

## 2025-07-24 NOTE — ED PROVIDER NOTES
[facility]     Emergency Department     Faculty Attestation    I performed a history and physical examination of the patient and discussed management with the resident. I reviewed the resident’s note and agree with the documented findings and plan of care. Any areas of disagreement are noted on the chart. I was personally present for the key portions of any procedures. I have documented in the chart those procedures where I was not present during the key portions. I have reviewed the emergency nurses triage note. I agree with the chief complaint, past medical history, past surgical history, allergies, medications, social and family history as documented unless otherwise noted below.        For Physician Assistant/ Nurse Practitioner cases/documentation I have personally evaluated this patient and have completed at least one if not all key elements of the E/M (history, physical exam, and MDM). Additional findings are as noted.  I have personally seen and evaluated the patient.  I find the patient's history and physical exam are consistent with the NP/PA documentation.  I agree with the care provided, treatment rendered, disposition and follow-up plan.    Dental pain with dental caries left lower molar with swelling noted to the left mandible consistent with early dental abscess no submental involvement at the time      Critical Care     Duglas Lazaro M.D.  Attending Emergency  Physician           Duglas Lazaro MD  07/24/25 5999

## 2025-07-26 ENCOUNTER — HOSPITAL ENCOUNTER (INPATIENT)
Age: 60
LOS: 1 days | Discharge: HOME | End: 2025-07-28
Attending: EMERGENCY MEDICINE | Admitting: INTERNAL MEDICINE
Payer: COMMERCIAL

## 2025-07-26 ENCOUNTER — APPOINTMENT (OUTPATIENT)
Dept: CT IMAGING | Age: 60
End: 2025-07-26
Payer: COMMERCIAL

## 2025-07-26 DIAGNOSIS — L03.211 CELLULITIS OF FACE: Primary | ICD-10-CM

## 2025-07-26 DIAGNOSIS — K04.7 DENTAL INFECTION: ICD-10-CM

## 2025-07-26 DIAGNOSIS — M60.08 INFECTIVE MYOSITIS OF OTHER SITE: ICD-10-CM

## 2025-07-26 LAB
ANION GAP SERPL CALCULATED.3IONS-SCNC: 15 MMOL/L (ref 9–16)
BASOPHILS # BLD: 0.05 K/UL (ref 0–0.2)
BASOPHILS NFR BLD: 1 % (ref 0–2)
BUN SERPL-MCNC: 9 MG/DL (ref 8–23)
CALCIUM SERPL-MCNC: 9.3 MG/DL (ref 8.6–10.4)
CHLORIDE SERPL-SCNC: 103 MMOL/L (ref 98–107)
CO2 SERPL-SCNC: 19 MMOL/L (ref 20–31)
CREAT SERPL-MCNC: 0.8 MG/DL (ref 0.6–0.9)
CRP SERPL HS-MCNC: 86.1 MG/L (ref 0–5)
EOSINOPHIL # BLD: 0.2 K/UL (ref 0–0.44)
EOSINOPHILS RELATIVE PERCENT: 2 % (ref 1–4)
ERYTHROCYTE [DISTWIDTH] IN BLOOD BY AUTOMATED COUNT: 14.3 % (ref 11.8–14.4)
ERYTHROCYTE [SEDIMENTATION RATE] IN BLOOD BY PHOTOMETRIC METHOD: 61 MM/HR (ref 0–30)
GFR, ESTIMATED: 84 ML/MIN/1.73M2
GLUCOSE SERPL-MCNC: 114 MG/DL (ref 74–99)
HCT VFR BLD AUTO: 36.1 % (ref 36.3–47.1)
HGB BLD-MCNC: 12.1 G/DL (ref 11.9–15.1)
IMM GRANULOCYTES # BLD AUTO: 0.03 K/UL (ref 0–0.3)
IMM GRANULOCYTES NFR BLD: 0 %
LYMPHOCYTES NFR BLD: 2.24 K/UL (ref 1.1–3.7)
LYMPHOCYTES RELATIVE PERCENT: 24 % (ref 24–43)
MCH RBC QN AUTO: 28.3 PG (ref 25.2–33.5)
MCHC RBC AUTO-ENTMCNC: 33.5 G/DL (ref 28.4–34.8)
MCV RBC AUTO: 84.3 FL (ref 82.6–102.9)
MONOCYTES NFR BLD: 0.91 K/UL (ref 0.1–1.2)
MONOCYTES NFR BLD: 10 % (ref 3–12)
NEUTROPHILS NFR BLD: 63 % (ref 36–65)
NEUTS SEG NFR BLD: 6.1 K/UL (ref 1.5–8.1)
NRBC BLD-RTO: 0 PER 100 WBC
PLATELET # BLD AUTO: 274 K/UL (ref 138–453)
PMV BLD AUTO: 9.1 FL (ref 8.1–13.5)
POTASSIUM SERPL-SCNC: 3.5 MMOL/L (ref 3.7–5.3)
RBC # BLD AUTO: 4.28 M/UL (ref 3.95–5.11)
SODIUM SERPL-SCNC: 137 MMOL/L (ref 136–145)
WBC OTHER # BLD: 9.5 K/UL (ref 3.5–11.3)

## 2025-07-26 PROCEDURE — 99285 EMERGENCY DEPT VISIT HI MDM: CPT

## 2025-07-26 PROCEDURE — 96375 TX/PRO/DX INJ NEW DRUG ADDON: CPT

## 2025-07-26 PROCEDURE — 6360000002 HC RX W HCPCS

## 2025-07-26 PROCEDURE — 80048 BASIC METABOLIC PNL TOTAL CA: CPT

## 2025-07-26 PROCEDURE — 70491 CT SOFT TISSUE NECK W/DYE: CPT

## 2025-07-26 PROCEDURE — 85025 COMPLETE CBC W/AUTO DIFF WBC: CPT

## 2025-07-26 PROCEDURE — 85652 RBC SED RATE AUTOMATED: CPT

## 2025-07-26 PROCEDURE — 96374 THER/PROPH/DIAG INJ IV PUSH: CPT

## 2025-07-26 PROCEDURE — 86140 C-REACTIVE PROTEIN: CPT

## 2025-07-26 PROCEDURE — 6360000004 HC RX CONTRAST MEDICATION

## 2025-07-26 RX ORDER — IOPAMIDOL 755 MG/ML
75 INJECTION, SOLUTION INTRAVASCULAR
Status: COMPLETED | OUTPATIENT
Start: 2025-07-26 | End: 2025-07-26

## 2025-07-26 RX ORDER — KETOROLAC TROMETHAMINE 15 MG/ML
15 INJECTION, SOLUTION INTRAMUSCULAR; INTRAVENOUS ONCE
Status: COMPLETED | OUTPATIENT
Start: 2025-07-26 | End: 2025-07-26

## 2025-07-26 RX ORDER — CLINDAMYCIN PHOSPHATE 900 MG/50ML
900 INJECTION, SOLUTION INTRAVENOUS EVERY 8 HOURS
Status: DISCONTINUED | OUTPATIENT
Start: 2025-07-26 | End: 2025-07-28 | Stop reason: HOSPADM

## 2025-07-26 RX ORDER — FENTANYL CITRATE 50 UG/ML
25 INJECTION, SOLUTION INTRAMUSCULAR; INTRAVENOUS ONCE
Status: DISCONTINUED | OUTPATIENT
Start: 2025-07-26 | End: 2025-07-27

## 2025-07-26 RX ADMIN — KETOROLAC TROMETHAMINE 15 MG: 15 INJECTION, SOLUTION INTRAMUSCULAR; INTRAVENOUS at 21:12

## 2025-07-26 RX ADMIN — IOPAMIDOL 75 ML: 755 INJECTION, SOLUTION INTRAVENOUS at 21:54

## 2025-07-26 ASSESSMENT — PAIN SCALES - GENERAL: PAINLEVEL_OUTOF10: 10

## 2025-07-27 PROBLEM — K04.7 DENTAL INFECTION: Status: ACTIVE | Noted: 2025-07-27

## 2025-07-27 PROBLEM — L03.211 FACIAL CELLULITIS: Status: ACTIVE | Noted: 2025-07-27

## 2025-07-27 LAB
ANION GAP SERPL CALCULATED.3IONS-SCNC: 15 MMOL/L (ref 9–16)
BASOPHILS # BLD: 0.05 K/UL (ref 0–0.2)
BASOPHILS NFR BLD: 1 % (ref 0–2)
BUN SERPL-MCNC: 9 MG/DL (ref 8–23)
CALCIUM SERPL-MCNC: 9.2 MG/DL (ref 8.6–10.4)
CHLORIDE SERPL-SCNC: 104 MMOL/L (ref 98–107)
CO2 SERPL-SCNC: 19 MMOL/L (ref 20–31)
CREAT SERPL-MCNC: 0.7 MG/DL (ref 0.6–0.9)
CRP SERPL HS-MCNC: 95.8 MG/L (ref 0–5)
EOSINOPHIL # BLD: 0.23 K/UL (ref 0–0.44)
EOSINOPHILS RELATIVE PERCENT: 3 % (ref 1–4)
ERYTHROCYTE [DISTWIDTH] IN BLOOD BY AUTOMATED COUNT: 14.4 % (ref 11.8–14.4)
ERYTHROCYTE [SEDIMENTATION RATE] IN BLOOD BY PHOTOMETRIC METHOD: 115 MM/HR (ref 0–30)
GFR, ESTIMATED: >90 ML/MIN/1.73M2
GLUCOSE SERPL-MCNC: 76 MG/DL (ref 74–99)
HCT VFR BLD AUTO: 35.6 % (ref 36.3–47.1)
HGB BLD-MCNC: 11.7 G/DL (ref 11.9–15.1)
IMM GRANULOCYTES # BLD AUTO: 0.04 K/UL (ref 0–0.3)
IMM GRANULOCYTES NFR BLD: 0 %
LYMPHOCYTES NFR BLD: 2.25 K/UL (ref 1.1–3.7)
LYMPHOCYTES RELATIVE PERCENT: 25 % (ref 24–43)
MAGNESIUM SERPL-MCNC: 2 MG/DL (ref 1.6–2.4)
MCH RBC QN AUTO: 28.1 PG (ref 25.2–33.5)
MCHC RBC AUTO-ENTMCNC: 32.9 G/DL (ref 28.4–34.8)
MCV RBC AUTO: 85.6 FL (ref 82.6–102.9)
MONOCYTES NFR BLD: 1.04 K/UL (ref 0.1–1.2)
MONOCYTES NFR BLD: 11 % (ref 3–12)
NEUTROPHILS NFR BLD: 60 % (ref 36–65)
NEUTS SEG NFR BLD: 5.57 K/UL (ref 1.5–8.1)
NRBC BLD-RTO: 0 PER 100 WBC
PLATELET # BLD AUTO: 267 K/UL (ref 138–453)
PMV BLD AUTO: 9.8 FL (ref 8.1–13.5)
POTASSIUM SERPL-SCNC: 3.2 MMOL/L (ref 3.7–5.3)
RBC # BLD AUTO: 4.16 M/UL (ref 3.95–5.11)
SODIUM SERPL-SCNC: 138 MMOL/L (ref 136–145)
WBC OTHER # BLD: 9.2 K/UL (ref 3.5–11.3)

## 2025-07-27 PROCEDURE — 6360000002 HC RX W HCPCS

## 2025-07-27 PROCEDURE — 6370000000 HC RX 637 (ALT 250 FOR IP)

## 2025-07-27 PROCEDURE — 2500000003 HC RX 250 WO HCPCS

## 2025-07-27 PROCEDURE — 80048 BASIC METABOLIC PNL TOTAL CA: CPT

## 2025-07-27 PROCEDURE — 6370000000 HC RX 637 (ALT 250 FOR IP): Performed by: EMERGENCY MEDICINE

## 2025-07-27 PROCEDURE — 2580000003 HC RX 258

## 2025-07-27 PROCEDURE — 87040 BLOOD CULTURE FOR BACTERIA: CPT

## 2025-07-27 PROCEDURE — 1200000000 HC SEMI PRIVATE

## 2025-07-27 PROCEDURE — 96375 TX/PRO/DX INJ NEW DRUG ADDON: CPT

## 2025-07-27 PROCEDURE — 83735 ASSAY OF MAGNESIUM: CPT

## 2025-07-27 PROCEDURE — 99222 1ST HOSP IP/OBS MODERATE 55: CPT | Performed by: INTERNAL MEDICINE

## 2025-07-27 PROCEDURE — 36415 COLL VENOUS BLD VENIPUNCTURE: CPT

## 2025-07-27 PROCEDURE — 96366 THER/PROPH/DIAG IV INF ADDON: CPT

## 2025-07-27 PROCEDURE — G0378 HOSPITAL OBSERVATION PER HR: HCPCS

## 2025-07-27 PROCEDURE — 96365 THER/PROPH/DIAG IV INF INIT: CPT

## 2025-07-27 PROCEDURE — 86140 C-REACTIVE PROTEIN: CPT

## 2025-07-27 PROCEDURE — 85652 RBC SED RATE AUTOMATED: CPT

## 2025-07-27 PROCEDURE — 85025 COMPLETE CBC W/AUTO DIFF WBC: CPT

## 2025-07-27 RX ORDER — POTASSIUM CHLORIDE 1500 MG/1
40 TABLET, EXTENDED RELEASE ORAL PRN
Status: DISCONTINUED | OUTPATIENT
Start: 2025-07-27 | End: 2025-07-28 | Stop reason: HOSPADM

## 2025-07-27 RX ORDER — MORPHINE SULFATE 4 MG/ML
4 INJECTION, SOLUTION INTRAMUSCULAR; INTRAVENOUS EVERY 4 HOURS PRN
Status: COMPLETED | OUTPATIENT
Start: 2025-07-27 | End: 2025-07-27

## 2025-07-27 RX ORDER — SODIUM CHLORIDE 0.9 % (FLUSH) 0.9 %
5-40 SYRINGE (ML) INJECTION PRN
Status: DISCONTINUED | OUTPATIENT
Start: 2025-07-27 | End: 2025-07-28 | Stop reason: HOSPADM

## 2025-07-27 RX ORDER — POTASSIUM CHLORIDE 7.45 MG/ML
10 INJECTION INTRAVENOUS PRN
Status: DISCONTINUED | OUTPATIENT
Start: 2025-07-27 | End: 2025-07-28 | Stop reason: HOSPADM

## 2025-07-27 RX ORDER — ACETAMINOPHEN 650 MG/1
650 SUPPOSITORY RECTAL EVERY 6 HOURS PRN
Status: DISCONTINUED | OUTPATIENT
Start: 2025-07-27 | End: 2025-07-28 | Stop reason: HOSPADM

## 2025-07-27 RX ORDER — SODIUM CHLORIDE 9 MG/ML
INJECTION, SOLUTION INTRAVENOUS PRN
Status: DISCONTINUED | OUTPATIENT
Start: 2025-07-27 | End: 2025-07-28 | Stop reason: HOSPADM

## 2025-07-27 RX ORDER — MAGNESIUM SULFATE IN WATER 40 MG/ML
2000 INJECTION, SOLUTION INTRAVENOUS PRN
Status: DISCONTINUED | OUTPATIENT
Start: 2025-07-27 | End: 2025-07-28 | Stop reason: HOSPADM

## 2025-07-27 RX ORDER — FENTANYL CITRATE 50 UG/ML
25 INJECTION, SOLUTION INTRAMUSCULAR; INTRAVENOUS EVERY 4 HOURS PRN
Status: DISCONTINUED | OUTPATIENT
Start: 2025-07-27 | End: 2025-07-28 | Stop reason: HOSPADM

## 2025-07-27 RX ORDER — POLYETHYLENE GLYCOL 3350 17 G/17G
17 POWDER, FOR SOLUTION ORAL DAILY PRN
Status: DISCONTINUED | OUTPATIENT
Start: 2025-07-27 | End: 2025-07-28 | Stop reason: HOSPADM

## 2025-07-27 RX ORDER — ONDANSETRON 4 MG/1
4 TABLET, ORALLY DISINTEGRATING ORAL EVERY 8 HOURS PRN
Status: DISCONTINUED | OUTPATIENT
Start: 2025-07-27 | End: 2025-07-28 | Stop reason: HOSPADM

## 2025-07-27 RX ORDER — ONDANSETRON 2 MG/ML
4 INJECTION INTRAMUSCULAR; INTRAVENOUS EVERY 6 HOURS PRN
Status: DISCONTINUED | OUTPATIENT
Start: 2025-07-27 | End: 2025-07-28 | Stop reason: HOSPADM

## 2025-07-27 RX ORDER — SODIUM CHLORIDE 0.9 % (FLUSH) 0.9 %
5-40 SYRINGE (ML) INJECTION EVERY 12 HOURS SCHEDULED
Status: DISCONTINUED | OUTPATIENT
Start: 2025-07-27 | End: 2025-07-28 | Stop reason: HOSPADM

## 2025-07-27 RX ORDER — OXYCODONE HYDROCHLORIDE 5 MG/1
5 TABLET ORAL EVERY 4 HOURS PRN
Refills: 0 | Status: DISCONTINUED | OUTPATIENT
Start: 2025-07-27 | End: 2025-07-28 | Stop reason: HOSPADM

## 2025-07-27 RX ORDER — POLYETHYLENE GLYCOL 3350 17 G
2 POWDER IN PACKET (EA) ORAL
Status: DISCONTINUED | OUTPATIENT
Start: 2025-07-27 | End: 2025-07-28 | Stop reason: HOSPADM

## 2025-07-27 RX ORDER — ACETAMINOPHEN 325 MG/1
650 TABLET ORAL EVERY 6 HOURS PRN
Status: DISCONTINUED | OUTPATIENT
Start: 2025-07-27 | End: 2025-07-28 | Stop reason: HOSPADM

## 2025-07-27 RX ORDER — ENOXAPARIN SODIUM 100 MG/ML
40 INJECTION SUBCUTANEOUS DAILY
Status: DISCONTINUED | OUTPATIENT
Start: 2025-07-27 | End: 2025-07-28 | Stop reason: HOSPADM

## 2025-07-27 RX ORDER — ENOXAPARIN SODIUM 100 MG/ML
40 INJECTION SUBCUTANEOUS DAILY
Status: DISCONTINUED | OUTPATIENT
Start: 2025-07-27 | End: 2025-07-27 | Stop reason: SDUPTHER

## 2025-07-27 RX ADMIN — ACETAMINOPHEN 650 MG: 325 TABLET ORAL at 20:43

## 2025-07-27 RX ADMIN — SODIUM CHLORIDE, PRESERVATIVE FREE 10 ML: 5 INJECTION INTRAVENOUS at 14:30

## 2025-07-27 RX ADMIN — MORPHINE SULFATE 4 MG: 4 INJECTION INTRAVENOUS at 11:33

## 2025-07-27 RX ADMIN — SODIUM CHLORIDE, PRESERVATIVE FREE 10 ML: 5 INJECTION INTRAVENOUS at 07:38

## 2025-07-27 RX ADMIN — FENTANYL CITRATE 25 MCG: 50 INJECTION INTRAMUSCULAR; INTRAVENOUS at 16:57

## 2025-07-27 RX ADMIN — CLINDAMYCIN IN 5 PERCENT DEXTROSE 900 MG: 18 INJECTION, SOLUTION INTRAVENOUS at 08:16

## 2025-07-27 RX ADMIN — OXYCODONE 5 MG: 5 TABLET ORAL at 09:48

## 2025-07-27 RX ADMIN — BENZOCAINE: 0.1 GEL TOPICAL at 07:36

## 2025-07-27 RX ADMIN — OXYCODONE 5 MG: 5 TABLET ORAL at 20:03

## 2025-07-27 RX ADMIN — CLINDAMYCIN IN 5 PERCENT DEXTROSE 900 MG: 18 INJECTION, SOLUTION INTRAVENOUS at 15:32

## 2025-07-27 RX ADMIN — POTASSIUM BICARBONATE 40 MEQ: 782 TABLET, EFFERVESCENT ORAL at 14:28

## 2025-07-27 RX ADMIN — ACETAMINOPHEN 650 MG: 325 TABLET ORAL at 00:57

## 2025-07-27 RX ADMIN — OXYCODONE 5 MG: 5 TABLET ORAL at 14:27

## 2025-07-27 RX ADMIN — NICOTINE POLACRILEX 2 MG: 2 LOZENGE ORAL at 11:35

## 2025-07-27 RX ADMIN — SODIUM CHLORIDE: 0.9 INJECTION, SOLUTION INTRAVENOUS at 15:31

## 2025-07-27 RX ADMIN — ONDANSETRON 4 MG: 4 TABLET, ORALLY DISINTEGRATING ORAL at 20:04

## 2025-07-27 RX ADMIN — CLINDAMYCIN IN 5 PERCENT DEXTROSE 900 MG: 18 INJECTION, SOLUTION INTRAVENOUS at 22:58

## 2025-07-27 RX ADMIN — NICOTINE POLACRILEX 2 MG: 2 LOZENGE ORAL at 09:51

## 2025-07-27 RX ADMIN — CLINDAMYCIN IN 5 PERCENT DEXTROSE 900 MG: 18 INJECTION, SOLUTION INTRAVENOUS at 00:21

## 2025-07-27 RX ADMIN — SODIUM CHLORIDE: 0.9 INJECTION, SOLUTION INTRAVENOUS at 08:15

## 2025-07-27 ASSESSMENT — PAIN SCALES - WONG BAKER
WONGBAKER_NUMERICALRESPONSE: HURTS A LITTLE BIT
WONGBAKER_NUMERICALRESPONSE: HURTS A LITTLE BIT

## 2025-07-27 ASSESSMENT — PAIN DESCRIPTION - LOCATION
LOCATION: JAW;MOUTH
LOCATION: MOUTH
LOCATION: MOUTH;HEAD
LOCATION: ABDOMEN
LOCATION: MOUTH
LOCATION: MOUTH;JAW

## 2025-07-27 ASSESSMENT — PAIN SCALES - GENERAL
PAINLEVEL_OUTOF10: 1
PAINLEVEL_OUTOF10: 10
PAINLEVEL_OUTOF10: 10
PAINLEVEL_OUTOF10: 1
PAINLEVEL_OUTOF10: 10
PAINLEVEL_OUTOF10: 8
PAINLEVEL_OUTOF10: 0
PAINLEVEL_OUTOF10: 10

## 2025-07-27 ASSESSMENT — PAIN DESCRIPTION - ORIENTATION
ORIENTATION: LEFT

## 2025-07-27 ASSESSMENT — PAIN DESCRIPTION - ONSET: ONSET: ON-GOING

## 2025-07-27 ASSESSMENT — PAIN DESCRIPTION - DESCRIPTORS: DESCRIPTORS: THROBBING

## 2025-07-27 ASSESSMENT — PAIN DESCRIPTION - PAIN TYPE: TYPE: ACUTE PAIN

## 2025-07-27 ASSESSMENT — PAIN DESCRIPTION - FREQUENCY: FREQUENCY: CONTINUOUS

## 2025-07-27 NOTE — ED NOTES
ED to inpatient nurses report      Chief Complaint:  Chief Complaint   Patient presents with    Dental Pain     Present to ED from: triage    MOA:     LOC: alert and orientated to name, place, date  Mobility: Independent  Oxygen Baseline: RA    Current needs required: RA   Pending ED orders: none  Present condition: stable    Why did the patient come to the ED?     Patient to ED via triage with complaints of left sided dental pain. Patient has an abscess and states she has an appointment in August but is unable to wait until the appointment. Patient was here a couple days ago and states she was sent home with Clindamycin, hydrocodone, and motrin. Patient reports no relief. Patient has swelling to the left side of the face. Patient is A&O x4 and RR even and unlabored.      What is the plan? abx  Any procedures or intervention occur? Labs, CT, abx  Any safety concerns??    Mental Status:       Psych Assessment:   Psychosocial  Psychosocial (WDL): Within Defined Limits  Vital signs   Vitals:    07/26/25 1952   BP: (!) 139/100   Pulse: (!) 110   Resp: 18   Temp: 99.9 °F (37.7 °C)   SpO2: 100%        Vitals:  Patient Vitals for the past 24 hrs:   BP Temp Pulse Resp SpO2   07/26/25 1952 (!) 139/100 99.9 °F (37.7 °C) (!) 110 18 100 %      Visit Vitals  BP (!) 139/100   Pulse (!) 110   Temp 99.9 °F (37.7 °C)   Resp 18   SpO2 100%        LDAs:   Peripheral IV 07/26/25 Right Antecubital (Active)       Ambulatory Status:  Presents to emergency department  because of falls (Syncope, seizure, or loss of consciousness): No, Age > 70: No, Altered Mental Status, Intoxication with alcohol or substance confusion (Disorientation, impaired judgment, poor safety awaremess, or inability to follow instructions): No, Impaired Mobility: Ambulates or transfers with assistive devices or assistance; Unable to ambulate or transer.: No, Nursing Judgement: No    Diagnosis:  DISPOSITION Admitted 07/26/2025 11:47:25 PM   Final diagnoses:

## 2025-07-27 NOTE — CARE COORDINATION
Case Management Assessment  Initial Evaluation    Date/Time of Evaluation: 7/27/2025 4:50 PM  Assessment Completed by: Maryan Caldwell    If patient is discharged prior to next notation, then this note serves as note for discharge by case management.    Patient Name: Larisa Phelps                   YOB: 1965  Diagnosis: Cellulitis of face [L03.211]  Infective myositis of other site [M60.08]  Facial cellulitis [L03.211]  Dental infection [K04.7]                   Date / Time: 7/26/2025  8:38 PM    Patient Admission Status: Inpatient   Readmission Risk (Low < 19, Mod (19-27), High > 27): Readmission Risk Score: 6.5    Current PCP: Suraj Abdi MD  PCP verified by CM? No (Agreeable to [facility])    Chart Reviewed: Yes      History Provided by: Patient, Child/Family  Patient Orientation: Alert and Oriented, Person, Place, Situation    Patient Cognition: Alert    Hospitalization in the last 30 days (Readmission):  No    If yes, Readmission Assessment in  Navigator will be completed.    Advance Directives:      Code Status: Full Code   Patient's Primary Decision Maker is: Legal Next of Kin      Discharge Planning:    Patient lives with: Family Members Type of Home: House  Primary Care Giver: Self  Patient Support Systems include: Children, Family Members   Current Financial resources: Medicaid  Current community resources: None  Current services prior to admission: None            Current DME:              Type of Home Care services:  None    ADLS  Prior functional level: Independent in ADLs/IADLs  Current functional level: Independent in ADLs/IADLs    PT AM-PAC:   /24  OT AM-PAC:   /24    Family can provide assistance at DC: Yes  Would you like Case Management to discuss the discharge plan with any other family members/significant others, and if so, who? Yes (Children- Mahamed and Angie)  Plans to Return to Present Housing: Yes  Other Identified Issues/Barriers to RETURNING to current

## 2025-07-27 NOTE — ED PROVIDER NOTES
[facility] EMERGENCY DEPARTMENT  Emergency Department Encounter  Emergency Medicine Resident     Pt Name:Larisa Phelps  MRN: [id number]  Birthdate 1965  Date of evaluation: 7/26/25  PCP:  Suraj Abdi MD  Note Started: 8:51 PM EDT      CHIEF COMPLAINT       Chief Complaint   Patient presents with    Dental Pain     \"This is not getting better.\"    HISTORY OF PRESENT ILLNESS  (Location/Symptom, Timing/Onset, Context/Setting, Quality, Duration, Modifying Factors, Severity.)      Larisa Phelps is a 60 y.o. female, smoker, who presents with persistent dental pain with overlying swelling for the past 2 days since last being seen in the ED for similar complaints.  She was discharged from the ED on 7/20/2025 with clindamycin for 10 days and Norco for pain regimen. Endorses swelling has improved. But notes pain has been poorly controlled with home regimen. She is scheduled to see a dentist on 8/7/25. Endorses inability to sleep and decreased PO intake due to pain with difficulty opening her mouth.  Denies fever, chills, dysphagia, chest pain, SOB, nausea, or vomiting.     PAST MEDICAL / SURGICAL / SOCIAL / FAMILY HISTORY     She has a past medical history of Allergic rhinitis and Seizures ([facility]).    She  has no past surgical history on file.      Social History     Socioeconomic History    Marital status: Single     Spouse name: Not on file    Number of children: Not on file    Years of education: Not on file    Highest education level: Not on file   Occupational History    Not on file   Tobacco Use    Smoking status: Every Day     Current packs/day: 1.00     Average packs/day: 1 pack/day for 30.0 years (30.0 ttl pk-yrs)     Types: Cigarettes    Smokeless tobacco: Never   Substance and Sexual Activity    Alcohol use: Yes     Comment: weekends-  beer and liquor    Drug use: No    Sexual activity: Not on file   Other Topics Concern    Not on file   Social History Narrative    Not on file  abscess, Pb's angina, or dental abscess.    Tachycardic on arrival.  Afebrile.  Exam demonstrates swelling over the left mandibular region with overlying tenderness.  Uvula was midline so peritonsillar abscess is unlikely.  Pb's angina was considered however patient is not having swelling over the submental region which is where it typically occurs.  Will still obtain a CT soft tissue neck with contrast to have complete evaluation of any soft tissue collection throughout the neck. Will order CBC, CMP, CRP, and ESR.  Will give Toradol 15 mg and fentanyl 25 mg for pain we will continue to monitor and reassess.  Consider admission to observation unit for IV antibiotics and symptomatic management depending on lab and imaging findings.    Patient care transferred to Dr. Tinsley with signout.  All questions and concerns were addressed.    Amount and/or Complexity of Data Reviewed  Labs: ordered. Decision-making details documented in ED Course.  Radiology: ordered.    Risk  Prescription drug management.          EMERGENCY DEPARTMENT COURSE:  ED Course as of 07/26/25 2300   Sat Jul 26, 2025 2113 Toradol 15 mg given.  [TD]   2127 WBC: 9.5 [KM]   2127 Hemoglobin Quant: 12.1 [KM]   2140 Sedimentation Rate(!):    Sed Rate, Automated 61(!) [KM]   2143 Sodium: 137 [KM]   2143 Potassium(!): 3.5 [KM]   2143 Glucose(!): 114 [KM]   2143 BUN,BUNPL: 9 [KM]   2143 Creatinine: 0.8 [KM]   2143 Est, Glom Filt Rate: 84 [KM]   2143 C-Reactive Protein(!):    CRP 86.1(!) [KM]      ED Course User Index  [KM] Bill Tinsley MD  [TD] Fred Alberto MD       CONSULTS:  None    FINAL IMPRESSION      No diagnosis found.      DISPOSITION / PLAN     DISPOSITION           PATIENT REFERRED TO:  No follow-up provider specified.    DISCHARGE MEDICATIONS:  New Prescriptions    No medications on file       Fred Alberto MD  Emergency Medicine Resident    (Please note that portions of this note were completed with a voice

## 2025-07-27 NOTE — H&P
[facility]  CDU / OBSERVATION ENCOUNTER  Physician NOTE     Pt Name: Larisa Phelps  MRN: [id number]  Birthdate 1965  Date of evaluation: 7/27/25  Patient's PCP is :  Suraj Abdi MD    CHIEF COMPLAINT       Chief Complaint   Patient presents with    Dental Pain     HISTORY OF PRESENT ILLNESS    Larisa Phelps is a 60 y.o. female, with history of dental carries and tobacco use, who presents with persistent left, lower, posterior dental pain for 2 days.  Patient was initially seen in the ED on 7/20/2025, discharged with clindamycin for 10 days (secondary to penicillin allergy) and Norco for pain management with follow-up with dentist on 8/7/2025..  Pain uncontrolled with failed outpatient management, returned to ED for pain control. Per patient, the swelling has improved however she has difficulty opening her jaw and increased pain. She denies any difficulty breathing or chest pain.    Location/Symptom: Left lower posterior dental pain with surrounding swelling and pain  Timing/Onset: Constant since onset, unless managed with pain control medications  Provocation: Unknown  Quality: Throbbing severe pain  Radiation: Evaluation today extends entirety of her mandible with new onset \"headache\" to left temple region.   Severity: 10/10  Timing/Duration: Constant  Modifying Factors: Area of swelling along jaw/cheek tender to touch    History was obtained in part through review of the ED chart. When possible, a direct discussion was had with ED nurses, residents, and attendings  REVIEW OF SYSTEMS       General ROS - No fevers, No malaise   ENT ROS -  No sore throat, No rhinorrhea, left lower posterior dental pain with swelling extending to the left mandible and cheek with pain to touch  Respiratory ROS - no shortness of breath, no cough, no  wheezing  Cardiovascular ROS - No chest pain, no dyspnea on exertion  Gastrointestinal ROS - No abdominal pain, no nausea or vomiting, no change  pain control.   Additional pain medication provided given persistent and uncontrolled pain.   DISPO: Admitted to Internal Medicine services for continued antibiotic treatment and management of soft tissue complications.     CONSULTS:    None      PATIENT REFERRED TO:    No follow-up provider specified.    --  Debby Escoto MD   Observation Physician    This dictation was generated by voice recognition computer software.  Although all attempts are made to edit the dictation for accuracy, there may be errors in the transcription that are not intended.

## 2025-07-27 NOTE — ED PROVIDER NOTES
left mandibular 3rd molar tooth measuring 1.0 x 0.5 x 1.0 cm. No obvious cortical destruction is seen. 4. Left-sided buccal fat stranding with dermal thickening, consistent with cellulitis.       RECENT VITALS:     Temp: 99.9 °F (37.7 °C),  Pulse: (!) 110, Respirations: 18, BP: (!) 139/100, SpO2: 100 %    This patient is a 60 y.o. Female with dental pain.  Symptoms been present for the past 48 hours and patient was previously seen here.  Norco ineffective for symptom control.  Clindamycin given.  Swelling has decreased to the left lower jaw.  Dental caries visible of the most posterior molar.  Concern for abscess and CT soft tissue neck ordered.  Inflammatory markers pending.  Appointment with dentist next week.  Obs for IV antibiotics if abscess not found.    ED Course as of 07/26/25 2346   Sat Jul 26, 2025 2113 Toradol 15 mg given.  [TD]   2127 WBC: 9.5 [KM]   2127 Hemoglobin Quant: 12.1 [KM]   2140 Sedimentation Rate(!):    Sed Rate, Automated 61(!) [KM]   2143 Sodium: 137 [KM]   2143 Potassium(!): 3.5 [KM]   2143 Glucose(!): 114 [KM]   2143 BUN,BUNPL: 9 [KM]   2143 Creatinine: 0.8 [KM]   2143 Est, Glom Filt Rate: 84 [KM]   2143 C-Reactive Protein(!):    CRP 86.1(!) [KM]   2334 CT SOFT TISSUE NECK W CONTRAST  IMPRESSION:  1. Periapical lucency surrounding the left mandibular 3rd molar tooth, with evidence of caries in the crown.  2. Bulky left masseter with hypodensity and surrounding fat stranding, consistent with myositis.  3. Trace amount of subperiosteal fluid adjacent to the left mandibular 3rd molar tooth measuring 1.0 x 0.5 x 1.0 cm. No obvious cortical destruction is seen.  4. Left-sided buccal fat stranding with dermal thickening, consistent with cellulitis. [KM]   2340 Results explained to patient who is amenable for observation for clindamycin 900 mg every 8 hours. [KM]      ED Course User Index  [KM] Bill Tinsley MD  [TD] Fred Alberto MD       OUTSTANDING TASKS / RECOMMENDATIONS:

## 2025-07-27 NOTE — ED PROVIDER NOTES
[facility] EMERGENCY DEPARTMENT     Emergency Department     Faculty Attestation        I performed a history and physical examination of the patient and discussed management with the resident. I reviewed the resident’s note and agree with the documented findings and plan of care. Any areas of disagreement are noted on the chart. I was personally present for the key portions of any procedures. I have documented in the chart those procedures where I was not present during the key portions. I have reviewed the emergency nurses triage note. I agree with the chief complaint, past medical history, past surgical history, allergies, medications, social and family history as documented unless otherwise noted below.  For Physician Assistant/ Nurse Practitioner cases/documentation I have personally evaluated this patient and have completed at least one if not all key elements of the E/M (history, physical exam, and MDM). Additional findings are as noted.      Vital Signs: BP: (!) 139/100  Pulse: (!) 110  Respirations: 18  Temp: 99.9 °F (37.7 °C) SpO2: 100 %  PCP:  Suraj Abdi MD  Note Started: 7/26/25, 8:55 PM EDT    Pertinent Comments:     Patient is a 60-year-old female with left lower jaw posterior dental infection with significant external swelling and pain seen 2 days ago.   Was placed on clindamycin secondary to penicillin allergy as well as Vicodin.   Swelling has improved somewhat but pain has worsened and is now having trouble opening her jaw.   Is able to do this and there is no wheezing or stridor and no difficulty swallowing or breathing but symptomatology is changing and worsening somewhat and here for reevaluation.   On exam no wheezing or stridor at the neck or mouth lungs are clear to auscultation bilaterally.   Heart is regular rate and rhythm to mildly tachycardic and abdomen is soft/nontender.   Posterior pharynx appears symmetric at this time but

## 2025-07-27 NOTE — ED NOTES
Patient to ED via triage with complaints of left sided dental pain. Patient has an abscess and states she has an appointment in August but is unable to wait until the appointment. Patient was here a couple days ago and states she was sent home with Clindamycin, hydrocodone, and motrin. Patient reports no relief. Patient has swelling to the left side of the face. Patient is A&O x4 and RR even and unlabored. Call light within reach.

## 2025-07-27 NOTE — H&P
[facility]     Department of Internal Medicine - Staff Internal Medicine Teaching Service          ADMISSION NOTE/HISTORY AND PHYSICAL EXAMINATION   Date: 7/27/2025  Patient Name: Larisa Phelps  Date of admission: 7/26/2025  8:38 PM  YOB: 1965  PCP: Suraj Abdi MD  History Obtained From:  patient, electronic medical record    CHIEF COMPLAINT     Chief complaint: Swelling of the face    HISTORY OF PRESENTING ILLNESS     The patient is a pleasant 60 y.o. female presents with a chief complaint of face swelling for the past 1 week.  The patient was seen recently in the ED on Thursday.  Patient was given clindamycin for possible dental infection.  She has an upcoming appointment with oral maxillofacial surgeon on 6 August.  Over the last few days the pain got worse and and there was injury swelling of her face.  She was unable to keep any food, although she cannot swallow liquid diet.  She does not have any respiratory distress and no airway compromise.  The patient denies any temperature spikes over the last few days.  She accidentally pain elevated infection going on for years and has been consulting, dentist but they were unable to perform the surgery as she required oral maxillofacial surgeon.  The patient remained on clindamycin since Thursday.  CT scan done on this admission showed periapical lucency surrounding the left mandibular third molar with evidence of caries in the crown.  Bulky left masseter with hypodensity and surrounding fat stranding consistent with myositis.  Trace amount of subperiosteal fluid adjacent to the left mandibular third molar tooth measuring one 2.5 and 1 cm no obvious cortical destruction is seen.  Left buccal cavity 5 cm dermal thickening consistent with cellulitis.  On examination patient had significant swelling on the left cheek and is unable to open her mouth to the fullest.  On oral examination she had inflammatory

## 2025-07-27 NOTE — PROGRESS NOTES
[facility]  CDU / OBSERVATION ENCOUNTER  ATTENDING NOTE         I performed a history and physical examination of the patient and discussed management with the resident or midlevel provider. I reviewed the resident or midlevel provider's note and agree with the documented findings and plan of care. Any areas of disagreement are noted on the chart. I was personally present for the key portions of any procedures. I have documented in the chart those procedures where I was not present during the key portions. I have reviewed the nurses notes. I agree with the chief complaint, past medical history, past surgical history, allergies, medications, social and family history as documented unless otherwise noted below.    The Family history, social history, and ROS are effectively unchanged since admission unless noted elsewhere in the chart.     This patient was placed in the observation unit for reevaluation for possible admission to the hospital     Patient with significant swelling and concern for cellulitis of face and neck.  Patient outpatient treatment failure.  Meeting admission criteria.  ID consulted.    Discussed with internal medicine.  Patient requiring at least another 24 hours of IV antibiotics and probably more.  Patient for ongoing IV therapy and reassessment.  Patient will need dental follow-up       Duglas Aviles MD  Attending Emergency  Physician

## 2025-07-27 NOTE — PROGRESS NOTES
CDU Transfer Summary        Patient:  Larisa Phelps  YOB: 1965    MRN: [id number]   Acct: [id number]    Primary Care Physician: Suraj Abdi MD    Admit date:  7/26/2025  8:38 PM  Transfer date: 7/27/2025    Transfer Diagnoses:     1.) Dental Pain  2.) Dental Infection            Medication List        ASK your doctor about these medications      acetaminophen 500 MG tablet  Commonly known as: TYLENOL  Take 2 tablets by mouth 4 times daily as needed for Pain     benzocaine 10 % mucosal gel  Commonly known as: ORAJEL  Take by mouth as needed.     clindamycin 150 MG capsule  Commonly known as: Cleocin  Take 1 capsule by mouth 3 times daily     HYDROcodone-acetaminophen 5-325 MG per tablet  Commonly known as: NORCO  Take 1 tablet by mouth every 6 hours as needed for Pain for up to 4 doses. Intended supply: 3 days. Take lowest dose possible to manage pain Max Daily Amount: 2 tablets  Ask about: Should I take this medication?     ibuprofen 800 MG tablet  Commonly known as: ADVIL;MOTRIN  Take 0.5 tablets by mouth 2 times daily as needed for Pain     ketoconazole 2 % cream  Commonly known as: NIZORAL  Apply topically daily for 3 weeks     ketorolac 10 MG tablet  Commonly known as: TORADOL  Take 1 tablet by mouth every 6 hours as needed for Pain     loratadine 10 MG tablet  Commonly known as: Claritin  Take 1 tablet by mouth daily.     nicotine polacrilex 2 MG gum  Commonly known as: Nicorette  Take 1 each by mouth as needed for Smoking cessation Maximum dose: 24 pieces/24 hours.     nystatin 110781 UNIT/GM cream  Commonly known as: MYCOSTATIN  Apply topically 2 times daily.              Diet:  ADULT DIET; Full Liquid, advance as tolerated     Activity:  As tolerated    Consultants: IP CONSULT TO INFECTIOUS DISEASES  IP CONSULT TO OTOLARYNGOLOGY    Procedures:  N/A    Diagnostic Test:   Results for orders placed or performed during the hospital encounter of 07/26/25   Culture, Blood 1     mg/dL    Creatinine 0.7 0.6 - 0.9 mg/dL    Est, Glom Filt Rate >90 >60 mL/min/1.73m2    Calcium 9.2 8.6 - 10.4 mg/dL   CBC with Auto Differential   Result Value Ref Range    WBC 9.2 3.5 - 11.3 k/uL    RBC 4.16 3.95 - 5.11 m/uL    Hemoglobin 11.7 (L) 11.9 - 15.1 g/dL    Hematocrit 35.6 (L) 36.3 - 47.1 %    MCV 85.6 82.6 - 102.9 fL    MCH 28.1 25.2 - 33.5 pg    MCHC 32.9 28.4 - 34.8 g/dL    RDW 14.4 11.8 - 14.4 %    Platelets 267 138 - 453 k/uL    MPV 9.8 8.1 - 13.5 fL    NRBC Automated 0.0 0.0 per 100 WBC    Neutrophils % 60 36 - 65 %    Lymphocytes % 25 24 - 43 %    Monocytes % 11 3 - 12 %    Eosinophils % 3 1 - 4 %    Basophils % 1 0 - 2 %    Immature Granulocytes % 0 0 %    Neutrophils Absolute 5.57 1.50 - 8.10 k/uL    Lymphocytes Absolute 2.25 1.10 - 3.70 k/uL    Monocytes Absolute 1.04 0.10 - 1.20 k/uL    Eosinophils Absolute 0.23 0.00 - 0.44 k/uL    Basophils Absolute 0.05 0.00 - 0.20 k/uL    Immature Granulocytes Absolute 0.04 0.00 - 0.30 k/uL   Magnesium   Result Value Ref Range    Magnesium 2.0 1.6 - 2.4 mg/dL   C-Reactive Protein   Result Value Ref Range    CRP 95.8 (H) 0.0 - 5.0 mg/L   Sedimentation Rate   Result Value Ref Range    Sed Rate, Automated 115 (H) 0 - 30 mm/Hr     CT SOFT TISSUE NECK W CONTRAST  Result Date: 7/26/2025  EXAMINATION: CT OF THE NECK SOFT TISSUE WITH CONTRAST  7/26/2025 TECHNIQUE: CT of the neck was performed with the administration of intravenous contrast. Multiplanar reformatted images are provided for review. Automated exposure control, iterative reconstruction, and/or weight based adjustment of the mA/kV was utilized to reduce the radiation dose to as low as reasonably achievable. COMPARISON: None. HISTORY: ORDERING SYSTEM PROVIDED HISTORY: Persistent dental pain despite antibiotics, concern for retropharyngeal abscess TECHNOLOGIST PROVIDED HISTORY: Persistent dental pain despite antibiotics, concern for retropharyngeal abscess Reason for Exam: Persistent dental pain despite

## 2025-07-28 ENCOUNTER — TELEPHONE (OUTPATIENT)
Age: 60
End: 2025-07-28

## 2025-07-28 VITALS
OXYGEN SATURATION: 97 % | TEMPERATURE: 98.8 F | DIASTOLIC BLOOD PRESSURE: 78 MMHG | BODY MASS INDEX: 31.56 KG/M2 | SYSTOLIC BLOOD PRESSURE: 110 MMHG | RESPIRATION RATE: 18 BRPM | HEART RATE: 84 BPM | HEIGHT: 63 IN | WEIGHT: 178.13 LBS

## 2025-07-28 LAB
BASOPHILS # BLD: 0.05 K/UL (ref 0–0.2)
BASOPHILS NFR BLD: 1 % (ref 0–2)
CRP SERPL HS-MCNC: 139 MG/L (ref 0–5)
EOSINOPHIL # BLD: 0.2 K/UL (ref 0–0.44)
EOSINOPHILS RELATIVE PERCENT: 3 % (ref 1–4)
ERYTHROCYTE [DISTWIDTH] IN BLOOD BY AUTOMATED COUNT: 14.4 % (ref 11.8–14.4)
HCT VFR BLD AUTO: 35.6 % (ref 36.3–47.1)
HGB BLD-MCNC: 11.7 G/DL (ref 11.9–15.1)
IMM GRANULOCYTES # BLD AUTO: 0.03 K/UL (ref 0–0.3)
IMM GRANULOCYTES NFR BLD: 0 %
LYMPHOCYTES NFR BLD: 1.84 K/UL (ref 1.1–3.7)
LYMPHOCYTES RELATIVE PERCENT: 23 % (ref 24–43)
MCH RBC QN AUTO: 28.3 PG (ref 25.2–33.5)
MCHC RBC AUTO-ENTMCNC: 32.9 G/DL (ref 28.4–34.8)
MCV RBC AUTO: 86 FL (ref 82.6–102.9)
MONOCYTES NFR BLD: 0.82 K/UL (ref 0.1–1.2)
MONOCYTES NFR BLD: 10 % (ref 3–12)
NEUTROPHILS NFR BLD: 63 % (ref 36–65)
NEUTS SEG NFR BLD: 4.92 K/UL (ref 1.5–8.1)
NRBC BLD-RTO: 0 PER 100 WBC
PLATELET # BLD AUTO: 263 K/UL (ref 138–453)
PMV BLD AUTO: 9.3 FL (ref 8.1–13.5)
RBC # BLD AUTO: 4.14 M/UL (ref 3.95–5.11)
WBC OTHER # BLD: 7.9 K/UL (ref 3.5–11.3)

## 2025-07-28 PROCEDURE — 99232 SBSQ HOSP IP/OBS MODERATE 35: CPT | Performed by: INTERNAL MEDICINE

## 2025-07-28 PROCEDURE — 96366 THER/PROPH/DIAG IV INF ADDON: CPT

## 2025-07-28 PROCEDURE — 6360000002 HC RX W HCPCS

## 2025-07-28 PROCEDURE — 2500000003 HC RX 250 WO HCPCS

## 2025-07-28 PROCEDURE — 99222 1ST HOSP IP/OBS MODERATE 55: CPT | Performed by: INTERNAL MEDICINE

## 2025-07-28 PROCEDURE — G0378 HOSPITAL OBSERVATION PER HR: HCPCS

## 2025-07-28 PROCEDURE — APPSS30 APP SPLIT SHARED TIME 16-30 MINUTES: Performed by: NURSE PRACTITIONER

## 2025-07-28 PROCEDURE — 96376 TX/PRO/DX INJ SAME DRUG ADON: CPT

## 2025-07-28 PROCEDURE — 6370000000 HC RX 637 (ALT 250 FOR IP)

## 2025-07-28 PROCEDURE — 86140 C-REACTIVE PROTEIN: CPT

## 2025-07-28 PROCEDURE — 36415 COLL VENOUS BLD VENIPUNCTURE: CPT

## 2025-07-28 PROCEDURE — 85025 COMPLETE CBC W/AUTO DIFF WBC: CPT

## 2025-07-28 RX ORDER — CLINDAMYCIN HYDROCHLORIDE 150 MG/1
300 CAPSULE ORAL 4 TIMES DAILY
Qty: 56 CAPSULE | Refills: 0 | Status: SHIPPED | OUTPATIENT
Start: 2025-07-28 | End: 2025-08-04

## 2025-07-28 RX ORDER — OXYCODONE HYDROCHLORIDE 5 MG/1
5 TABLET ORAL EVERY 4 HOURS PRN
Qty: 18 TABLET | Refills: 0 | Status: SHIPPED | OUTPATIENT
Start: 2025-07-28 | End: 2025-07-31

## 2025-07-28 RX ORDER — CLINDAMYCIN HYDROCHLORIDE 150 MG/1
150 CAPSULE ORAL 3 TIMES DAILY
Qty: 42 CAPSULE | Refills: 0 | Status: CANCELLED | OUTPATIENT
Start: 2025-07-28 | End: 2025-08-11

## 2025-07-28 RX ORDER — ACETAMINOPHEN 500 MG
1000 TABLET ORAL EVERY 6 HOURS PRN
Qty: 20 TABLET | Refills: 0 | Status: SHIPPED | OUTPATIENT
Start: 2025-07-28 | End: 2025-08-02

## 2025-07-28 RX ADMIN — CLINDAMYCIN IN 5 PERCENT DEXTROSE 900 MG: 18 INJECTION, SOLUTION INTRAVENOUS at 09:07

## 2025-07-28 RX ADMIN — SODIUM CHLORIDE, PRESERVATIVE FREE 10 ML: 5 INJECTION INTRAVENOUS at 09:04

## 2025-07-28 RX ADMIN — FENTANYL CITRATE 25 MCG: 50 INJECTION INTRAMUSCULAR; INTRAVENOUS at 00:04

## 2025-07-28 RX ADMIN — POTASSIUM CHLORIDE 40 MEQ: 1500 TABLET, EXTENDED RELEASE ORAL at 05:49

## 2025-07-28 ASSESSMENT — PAIN SCALES - GENERAL
PAINLEVEL_OUTOF10: 8
PAINLEVEL_OUTOF10: 1

## 2025-07-28 ASSESSMENT — ENCOUNTER SYMPTOMS
APNEA: 0
TROUBLE SWALLOWING: 0
ABDOMINAL DISTENTION: 0
VOICE CHANGE: 0
SHORTNESS OF BREATH: 0
ABDOMINAL PAIN: 0
CHEST TIGHTNESS: 0

## 2025-07-28 ASSESSMENT — PAIN SCALES - WONG BAKER: WONGBAKER_NUMERICALRESPONSE: HURTS A LITTLE BIT

## 2025-07-28 NOTE — CONSULTS
Patient with dental abscess which we are unable to treat and are not privileged to perform any procedures that are dental related.

## 2025-07-28 NOTE — TELEPHONE ENCOUNTER
----- Message from Maryan MAYES sent at 7/27/2025  5:27 PM EDT -----  Patient will need a New PCP Appointment/ Hospital Discharge follow up scheduled. Anticipate Hospital discharge later this week. Would need appointment after August 4th.    Thank you!    Maryan Caldwell RN Case Manager

## 2025-07-28 NOTE — PROGRESS NOTES
[facility]  Internal Medicine Teaching Residency Program  Inpatient Daily Progress Note  ______________________________________________________________________________    Patient: Larisa Phelps  YOB: 1965   MRN:[id number]    Acct: [id number]     Room: OBS 24/24-1  Admit date: 7/26/2025  Today's date: 07/28/25  Number of days in the hospital: 1    SUBJECTIVE   Admitting Diagnosis: Cellulitis of face  CC: Face swelling     Pt examined at bedside. Chart & results reviewed.  Overnight events, no acute events overnight  Patient able to take liquid diet but has been unsure  Vitals /84  Labs potassium 3.2 CRP 95 WBC open 9.2 hemoglobin 11.7  Potassium replacement done, repeat potassium  Patient has significant facial swelling on the left side CT suggestive of cellulitis dental abscess  ENT consult appreciated.  Advised to consult facial surgery      Review of Systems   Constitutional:  Negative for activity change, appetite change, chills and fever.   HENT:  Negative for congestion, trouble swallowing and voice change.    Respiratory:  Negative for apnea, chest tightness and shortness of breath.    Cardiovascular:  Negative for chest pain and leg swelling.   Gastrointestinal:  Negative for abdominal distention and abdominal pain.   Neurological:  Negative for speech difficulty and weakness.         BRIEF HISTORY     The patient is a pleasant 60 y.o. female presents with a chief complaint of face swelling for the past 1 week.  The patient was seen recently in the ED on Thursday.  Patient was given clindamycin for possible dental infection.  She has an upcoming appointment with oral maxillofacial surgeon on 6 August.  Over the last few days the pain got worse and and there was injury swelling of her face.  She was unable to keep any food, although she cannot swallow liquid diet.  She does not have any respiratory distress and no airway compromise.  The  input(s): \"APTT\" in the last 72 hours.  CARDIAC ENZYMES: No results for input(s): \"CKMB\", \"CKMBINDEX\", \"TROPONINI\" in the last 72 hours.    Invalid input(s): \"CKTOTAL;3\"  FASTING LIPID PANEL:  Lab Results   Component Value Date    CHOL 174 08/16/2016    HDL 42 08/16/2016    TRIG 130 08/16/2016     LIVER PROFILE: No results for input(s): \"AST\", \"ALT\", \"BILIDIR\", \"BILITOT\", \"ALKPHOS\" in the last 72 hours.    Invalid input(s): \"ALB\"   MICROBIOLOGY:   Lab Results   Component Value Date/Time    CULTURE NO GROWTH 12 HOURS 07/27/2025 11:32 AM    CULTURE NO GROWTH 12 HOURS 07/27/2025 11:32 AM       Imaging:    CT SOFT TISSUE NECK W CONTRAST  Result Date: 7/26/2025  1. Periapical lucency surrounding the left mandibular 3rd molar tooth, with evidence of caries in the crown. 2. Bulky left masseter with hypodensity and surrounding fat stranding, consistent with myositis. 3. Trace amount of subperiosteal fluid adjacent to the left mandibular 3rd molar tooth measuring 1.0 x 0.5 x 1.0 cm. No obvious cortical destruction is seen. 4. Left-sided buccal fat stranding with dermal thickening, consistent with cellulitis.       ASSESSMENT & PLAN     ASSESSMENT / PLAN:     Principal Problem:    Cellulitis of face  Active Problems:    Facial cellulitis    Dental infection  Resolved Problems:    * No resolved hospital problems. *     # Dental abscess  # Facial cellulitis  Significant swelling on the left side of her face  CRP 95.8  CT scan revealed masseter myositis, dental abscess, possible facial cellulitis  On clindamycin since Thursday  ENT consult appreciated.  Advised to consult dental surgery.  Continue with oral antibiotics  Continue to monitor if there is any respiratory distress  Follow ID consult.  The patient can possibly be discharged later today after ID consult with oral antibiotics  Patient jason has an upcoming appointment with oral maxillofacial surgeon on 7 August.  Patient to follow the appointment as an outpatient.

## 2025-07-28 NOTE — TELEPHONE ENCOUNTER
Attempted PC to pt but it rang once and was sent to , pt still admitted so  not left. Pt scheduled with Dr. Pino, whom she has seen while admitted, for 8/6/25 at 10:00. Per chart review, pt is scheduled for dental extraction on 8/7/25.

## 2025-07-28 NOTE — CARE COORDINATION
Case Management   Daily Progress Note       Patient Name: Larisa Phelps                   YOB: 1965  Diagnosis: Cellulitis of face [L03.211]  Infective myositis of other site [M60.08]  Facial cellulitis [L03.211]  Dental infection [K04.7]                         days  Length of Stay: 1  days    Anticipated Discharge Date: Two or more days before discharge    Readmission Risk (Low < 19, Mod (19-27), High > 27): Readmission Risk Score: 6.4        Current Transitional Plan    [] Home Independently    [] Home with HC    [] Skilled Nursing Facility    [] Acute Rehabilitation    [] Long Term Acute Care (LTAC)    [] Other:     Plan for the Stay (Medical Management) :    CM received message from [facility], Appointment scheduled 8/6/25 at 10:00 Dr. Pino. If appointment time does not work, patient to call office to reschedule.       Workflow Continuation (Additional Notes) :        Maryan Caldwell  July 28, 2025

## 2025-07-28 NOTE — DISCHARGE INSTRUCTIONS
You presented with left tooth pain and were found to have a dental abscess with facial cellulitis (superficial skin (cheek) infection). You were seen by infectious disease and have been started on po antibiotics. Please take antibiotics as prescribed    Please follow-up with orofacial surgeon at your next scheduled appointment on 8/7. You will need to get this abscess drained   If you begin to experience any fevers, chills, difficulties swallowing, changes to voice, shortness of breath, please return to the ER for further evaluation.  You are being discharged on percocet. Please take as needed for pain and alternate with tylenol. Do not drive, climb to heights, operate heavy machinery, or swim while taking this medication as it is known to have sedating side effects.     Please follow-up with your primary care provider within 5 to 7 days for continued care.   If you have been given medication please take them as prescribed. Do not take more medication than recommended at any given time.   If you begin to experience any symptoms such as chest pain shortness of breath nausea vomiting dizziness drowsiness abdominal pain loss of consciousness or any other symptoms you find concerning please return to the ED for follow-up evaluation.  Please feel free return to the hospital if your symptoms worsen or any new concerning symptoms develop.  Follow-up with your primary care physician as needed for all other the concerns.

## 2025-07-28 NOTE — CONSULTS
ATTESTATION:    I have discussed the case, including pertinent history and exam findings with the APRN. I have evaluated the  History, physical findings and pictures of the patient and the key elements of the encounter have been performed by me. I have reviewed the laboratory data, other diagnostic studies and discussed them with the APRN. I have updated the medical record where necessary.    I agree with the assessment, plan and orders as documented by the APRN.    In addition diagnostic and decision making elements, performed by the Attending Physician, are included in the Diagnostic and Decision Making Section of the text.    Elements of Medical Decision Making:  Note: I have independently performed the steps listed below as part of the medical decision making and evaluation.   Examined and discussed with patient.  Left mandibular dental abscess with facial cellulitis  Concern for left masseter myositis  Dental caries teeth 17 and 18  CRP elevation  Hypokalemia  Seizure disorder  Cigarette smoker  Penicillin allergy  Labs, medications, radiologic studies were reviewed with personal review of films  Radiologic studies  Lab work  Cultures  Large amounts of data were reviewed  Please see the history of present illness and progress note  Discussed with nursing Staff, Discharge planner  Dr. Brown  Infection Control and Prevention measures reviewed  Universal precaution  All prior entries were reviewed  Internal medicine notes reviewed  Administer medications as ordered  On IV clindamycin  Penicillin allergy  Dental evaluation  Prognosis:   Guarded  Discharge planning reviewed  Follow up as outpatient.    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

## 2025-07-29 ENCOUNTER — CARE COORDINATION (OUTPATIENT)
Dept: CARE COORDINATION | Age: 60
End: 2025-07-29

## 2025-07-29 DIAGNOSIS — L03.211 FACIAL CELLULITIS: Primary | ICD-10-CM

## 2025-07-29 NOTE — CARE COORDINATION
Care Transitions Note    Initial Call - Call within 2 business days of discharge: Yes    Attempted to reach patient for transitions of care follow up. Unable to reach patient.    Outreach Attempts:   HIPAA compliant voicemail left for patient.     Patient: Larisa Phelps    Patient : 1965   MRN: [id number]    Reason for Admission: Cellulitis of face  Discharge Date: 25  RURS: Readmission Risk Score: 6.1    Last Discharge Facility       Date Complaint Diagnosis Description Type Department Provider    25 Dental Pain Cellulitis of face ... ED to Hosp-Admission (Discharged) (ADMITTED) STHema Das MD; Rickey Caputo...            Was this an external facility discharge? No    Follow Up Appointment:   Patient has hospital follow up appointment scheduled within 7 days of discharge.    Future Appointments         Provider Specialty Dept Phone    2025 10:00 AM Slick Pino MD Internal Medicine [phone]            Plan for follow-up on next business day.      Kassandra Wilburn RN

## 2025-07-30 ENCOUNTER — CARE COORDINATION (OUTPATIENT)
Dept: CARE COORDINATION | Age: 60
End: 2025-07-30

## 2025-07-30 NOTE — CARE COORDINATION
Care Transitions Note    Initial Call - Call within 2 business days of discharge: Yes    Attempted to reach patient for transitions of care follow up. Unable to reach patient.    Outreach Attempts:   HIPAA compliant voicemail left for patient.     Patient: Larisa Phelps    Patient : 1965   MRN: [id number]    Reason for Admission: Cellulitis of face  Discharge Date: 25  RURS: Readmission Risk Score: 6.1    Last Discharge Facility       Date Complaint Diagnosis Description Type Department Provider    25 Dental Pain Cellulitis of face ... ED to Hosp-Admission (Discharged) (ADMITTED) STHema Das MD; Rickey Caputo...            Was this an external facility discharge? No    Follow Up Appointment:   Patient has hospital follow up appointment scheduled within 14 days of discharge.    Future Appointments         Provider Specialty Dept Phone    2025 10:00 AM Slick Pino MD Internal Medicine [phone]            No further follow-up call indicated     Kassandra Wilburn RN

## 2025-07-30 NOTE — CARE COORDINATION
Care Transitions Note    Initial Call - Call within 2 business days of discharge: Yes    Patient Current Location:  [address]: [address] [address]    Care Transition Nurse contacted the patient by telephone to perform post hospital discharge assessment, verified name and  as identifiers.  Provided introduction to self, and explanation of the Care Transition Nurse role.    Patient: Larisa Phelps    Patient : 1965   MRN: [id number]    Reason for Admission: Facial cellulitis  Discharge Date: 25  RURS: Readmission Risk Score: 6.1      Last Discharge Facility       Date Complaint Diagnosis Description Type Department Provider    25 Dental Pain Cellulitis of face ... ED to Hosp-Admission (Discharged) (ADMITTED) Hema Cuadra MD; Rickey Caputo...            Was this an external facility discharge? No    Additional needs identified to be addressed with provider   No needs identified             Method of communication with provider: none.    Patients top risk factors for readmission: medical condition-Facial cellulitis    Interventions to address risk factors:   Education: Medications as ordered, monitor for worsening s/s of infection, increased swelling to face, increased pain, f/c, n/v, follow up with PCP, ID and oral surgeon.     Care Summary Note: Spoke with patient for initial 24 hour follow up call. Patient had come to the hospital with c/o dental pain, had swelling to face and difficulty opening jaw.  She was seen by ENT during her stay and discharged to home with instruction to complete her course of ATB and follow up with oral surgeon.  Today, she said the swelling to her face has went down some, pain is much less intense as it has been.  She has been dealing with tooth pain she states since May, has had difficulty finding dentist/oral surgeon with her insurance and long wait lists.  She has an appointment to see the oral surgeon on  out in [facility].  She

## 2025-08-01 LAB
MICROORGANISM SPEC CULT: NORMAL
MICROORGANISM SPEC CULT: NORMAL
SERVICE CMNT-IMP: NORMAL
SERVICE CMNT-IMP: NORMAL
SPECIMEN DESCRIPTION: NORMAL
SPECIMEN DESCRIPTION: NORMAL

## 2025-08-03 NOTE — DISCHARGE SUMMARY
[facility]     Department of Internal Medicine - Staff Internal Medicine Teaching Service    INPATIENT DISCHARGE SUMMARY      Patient Identification:  Larisa Phelps is a 60 y.o. female.  :  1965  MRN: [id number]     Acct: [id number]   PCP: Suraj Abdi MD  Admit Date:  2025  Discharge date and time: 2025  4:39 PM   Attending Provider: Kenny Carreon MD                                     ACTIVE DISCHARGE DIAGNOSES     Hospital Problem Lists:  Principal Problem:    Cellulitis of face  Active Problems:    Facial cellulitis    Dental infection  Resolved Problems:    * No resolved hospital problems. *      HOSPITAL STAY     Brief Inpatient course:   Larisa Phelps is a 60 y.o. female who presented with swelling of the face for the past 1 week.  Patient was recently seen for tooth pain and was prescribed clindamycin 2 days later the patient Pablito presented to the ED with left facial swelling and pain.  CT soft tissue was obtained that showed periapical lucency surrounding the left mandibular third molar with evidence of caries in the crown.  Bulky left masseter with hypodensity and surrounding fat stranding consistent with myositis.  Trace amount of subperiosteal fluid adjacent to the left mandibular third molar tooth measuring one 2.5 cm in 1 cm, no obvious cortical destruction is seen.  Left buccal cavity 5 cm dermal thickening consistent with cellulitis.  ENT consulted but they advised to be seen by oral and maxillofacial surgeon.  ID on board.  The patient already had an upcoming appointment with oral and maxillofacial surgeon in the upcoming week.  The next day the facial swelling improved and the patient was able to chew food.  During this admission at no point the patient had any respiratory distress or swallowing difficulty.  At the time of discharge the patient was hemodynamically stable       Procedures/ Significant Interventions:

## 2025-08-06 ENCOUNTER — OFFICE VISIT (OUTPATIENT)
Age: 60
End: 2025-08-06
Payer: COMMERCIAL

## 2025-08-06 ENCOUNTER — HOSPITAL ENCOUNTER (OUTPATIENT)
Age: 60
Setting detail: SPECIMEN
Discharge: HOME | End: 2025-08-06

## 2025-08-06 VITALS
OXYGEN SATURATION: 98 % | WEIGHT: 176 LBS | HEIGHT: 63 IN | HEART RATE: 74 BPM | BODY MASS INDEX: 31.18 KG/M2 | SYSTOLIC BLOOD PRESSURE: 110 MMHG | DIASTOLIC BLOOD PRESSURE: 70 MMHG

## 2025-08-06 DIAGNOSIS — Z01.818 PRE-OP EVALUATION: ICD-10-CM

## 2025-08-06 DIAGNOSIS — E78.49 OTHER HYPERLIPIDEMIA: ICD-10-CM

## 2025-08-06 DIAGNOSIS — M79.671 CHRONIC PAIN IN RIGHT FOOT: ICD-10-CM

## 2025-08-06 DIAGNOSIS — E87.6 HYPOKALEMIA: ICD-10-CM

## 2025-08-06 DIAGNOSIS — Z12.31 ENCOUNTER FOR SCREENING MAMMOGRAM FOR MALIGNANT NEOPLASM OF BREAST: ICD-10-CM

## 2025-08-06 DIAGNOSIS — Z87.891 PERSONAL HISTORY OF TOBACCO USE: Primary | ICD-10-CM

## 2025-08-06 DIAGNOSIS — R73.09 BLOOD GLUCOSE ABNORMAL: ICD-10-CM

## 2025-08-06 DIAGNOSIS — G89.29 CHRONIC PAIN IN RIGHT FOOT: ICD-10-CM

## 2025-08-06 PROCEDURE — 99203 OFFICE O/P NEW LOW 30 MIN: CPT

## 2025-08-06 PROCEDURE — G8427 DOCREV CUR MEDS BY ELIG CLIN: HCPCS

## 2025-08-06 PROCEDURE — 99204 OFFICE O/P NEW MOD 45 MIN: CPT

## 2025-08-06 PROCEDURE — G8417 CALC BMI ABV UP PARAM F/U: HCPCS

## 2025-08-06 PROCEDURE — 4004F PT TOBACCO SCREEN RCVD TLK: CPT

## 2025-08-06 PROCEDURE — 1111F DSCHRG MED/CURRENT MED MERGE: CPT

## 2025-08-06 PROCEDURE — 3017F COLORECTAL CA SCREEN DOC REV: CPT

## 2025-08-06 PROCEDURE — G0296 VISIT TO DETERM LDCT ELIG: HCPCS

## 2025-08-06 RX ORDER — POTASSIUM CITRATE 1080 MG/1
10 TABLET, EXTENDED RELEASE ORAL 2 TIMES DAILY
Qty: 30 TABLET | Refills: 0 | Status: SHIPPED
Start: 2025-08-06 | End: 2025-08-06 | Stop reason: CLARIF

## 2025-08-06 RX ORDER — POTASSIUM CITRATE 1080 MG/1
10 TABLET, EXTENDED RELEASE ORAL 2 TIMES DAILY
Qty: 30 TABLET | Refills: 0 | Status: SHIPPED | OUTPATIENT
Start: 2025-08-06 | End: 2025-08-08

## 2025-08-06 ASSESSMENT — ENCOUNTER SYMPTOMS
ABDOMINAL DISTENTION: 0
DIARRHEA: 0
ABDOMINAL PAIN: 0
APNEA: 0
CHEST TIGHTNESS: 0
BACK PAIN: 0
CONSTIPATION: 0

## 2025-08-06 ASSESSMENT — PATIENT HEALTH QUESTIONNAIRE - PHQ9
SUM OF ALL RESPONSES TO PHQ QUESTIONS 1-9: 0
2. FEELING DOWN, DEPRESSED OR HOPELESS: NOT AT ALL
1. LITTLE INTEREST OR PLEASURE IN DOING THINGS: NOT AT ALL
SUM OF ALL RESPONSES TO PHQ QUESTIONS 1-9: 0

## 2025-08-07 ENCOUNTER — HOSPITAL ENCOUNTER (OUTPATIENT)
Age: 60
Setting detail: SPECIMEN
Discharge: HOME | End: 2025-08-07

## 2025-08-07 DIAGNOSIS — M79.671 CHRONIC PAIN IN RIGHT FOOT: ICD-10-CM

## 2025-08-07 DIAGNOSIS — G89.29 CHRONIC PAIN IN RIGHT FOOT: ICD-10-CM

## 2025-08-07 DIAGNOSIS — R73.09 BLOOD GLUCOSE ABNORMAL: ICD-10-CM

## 2025-08-07 DIAGNOSIS — E78.49 OTHER HYPERLIPIDEMIA: ICD-10-CM

## 2025-08-07 LAB
CHOLEST SERPL-MCNC: 194 MG/DL (ref 0–199)
CHOLESTEROL/HDL RATIO: 4.1
EST. AVERAGE GLUCOSE BLD GHB EST-MCNC: 114 MG/DL
HBA1C MFR BLD: 5.6 % (ref 4–6)
HDLC SERPL-MCNC: 47 MG/DL
LDLC SERPL CALC-MCNC: 128 MG/DL (ref 0–100)
TRIGL SERPL-MCNC: 97 MG/DL (ref 0–149)
URATE SERPL-MCNC: 4.1 MG/DL (ref 2.4–5.7)
VLDLC SERPL CALC-MCNC: 19 MG/DL (ref 1–30)

## 2025-08-08 DIAGNOSIS — E87.6 HYPOKALEMIA: Primary | ICD-10-CM

## 2025-08-08 RX ORDER — POTASSIUM CITRATE 1080 MG/1
10 TABLET, EXTENDED RELEASE ORAL 2 TIMES DAILY
Qty: 30 TABLET | Refills: 0 | Status: SHIPPED | OUTPATIENT
Start: 2025-08-08 | End: 2025-08-23

## 2025-08-14 ENCOUNTER — OFFICE VISIT (OUTPATIENT)
Dept: INFECTIOUS DISEASES | Age: 60
End: 2025-08-14
Payer: COMMERCIAL

## 2025-08-14 VITALS
TEMPERATURE: 97 F | HEART RATE: 80 BPM | SYSTOLIC BLOOD PRESSURE: 108 MMHG | DIASTOLIC BLOOD PRESSURE: 82 MMHG | WEIGHT: 170 LBS | BODY MASS INDEX: 30.12 KG/M2 | HEIGHT: 63 IN

## 2025-08-14 DIAGNOSIS — K04.7 DENTAL INFECTION: ICD-10-CM

## 2025-08-14 DIAGNOSIS — L03.211 FACIAL CELLULITIS: Primary | ICD-10-CM

## 2025-08-14 PROCEDURE — G8417 CALC BMI ABV UP PARAM F/U: HCPCS | Performed by: INTERNAL MEDICINE

## 2025-08-14 PROCEDURE — 99213 OFFICE O/P EST LOW 20 MIN: CPT | Performed by: INTERNAL MEDICINE

## 2025-08-14 PROCEDURE — 4004F PT TOBACCO SCREEN RCVD TLK: CPT | Performed by: INTERNAL MEDICINE

## 2025-08-14 PROCEDURE — 3017F COLORECTAL CA SCREEN DOC REV: CPT | Performed by: INTERNAL MEDICINE

## 2025-08-14 PROCEDURE — G8427 DOCREV CUR MEDS BY ELIG CLIN: HCPCS | Performed by: INTERNAL MEDICINE

## 2025-08-14 PROCEDURE — 1111F DSCHRG MED/CURRENT MED MERGE: CPT | Performed by: INTERNAL MEDICINE

## 2025-08-30 ENCOUNTER — HOSPITAL ENCOUNTER (OUTPATIENT)
Dept: MAMMOGRAPHY | Age: 60
Discharge: HOME | End: 2025-09-01
Attending: INTERNAL MEDICINE
Payer: COMMERCIAL

## 2025-08-30 VITALS — HEIGHT: 62 IN | BODY MASS INDEX: 31.28 KG/M2 | WEIGHT: 170 LBS

## 2025-08-30 DIAGNOSIS — Z12.31 ENCOUNTER FOR SCREENING MAMMOGRAM FOR MALIGNANT NEOPLASM OF BREAST: ICD-10-CM

## 2025-08-30 PROCEDURE — 77063 BREAST TOMOSYNTHESIS BI: CPT

## 2025-09-03 ENCOUNTER — HOSPITAL ENCOUNTER (OUTPATIENT)
Dept: CT IMAGING | Age: 60
Discharge: HOME | End: 2025-09-05
Attending: INTERNAL MEDICINE
Payer: COMMERCIAL

## 2025-09-03 DIAGNOSIS — Z87.891 PERSONAL HISTORY OF TOBACCO USE: ICD-10-CM

## 2025-09-03 PROCEDURE — 71271 CT THORAX LUNG CANCER SCR C-: CPT
